# Patient Record
Sex: MALE | Race: WHITE | Employment: FULL TIME | URBAN - METROPOLITAN AREA
[De-identification: names, ages, dates, MRNs, and addresses within clinical notes are randomized per-mention and may not be internally consistent; named-entity substitution may affect disease eponyms.]

---
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
## 2019-01-11 ENCOUNTER — HOSPITAL ENCOUNTER (EMERGENCY)
Age: 19
Discharge: HOME | End: 2019-01-11
Payer: COMMERCIAL

## 2019-01-11 VITALS
SYSTOLIC BLOOD PRESSURE: 138 MMHG | WEIGHT: 175 LBS | RESPIRATION RATE: 16 BRPM | DIASTOLIC BLOOD PRESSURE: 68 MMHG | HEART RATE: 76 BPM | BODY MASS INDEX: 23.19 KG/M2 | TEMPERATURE: 98 F | HEIGHT: 73 IN | OXYGEN SATURATION: 99 %

## 2019-01-11 DIAGNOSIS — S05.01XA ABRASION OF RIGHT CORNEA, INITIAL ENCOUNTER: Primary | ICD-10-CM

## 2019-01-11 PROCEDURE — 99282 EMERGENCY DEPT VISIT SF MDM: CPT

## 2019-01-11 PROCEDURE — 6370000000 HC RX 637 (ALT 250 FOR IP): Performed by: PHYSICIAN ASSISTANT

## 2019-01-11 PROCEDURE — 6370000000 HC RX 637 (ALT 250 FOR IP)

## 2019-01-11 RX ORDER — HYDROCODONE BITARTRATE AND ACETAMINOPHEN 5; 325 MG/1; MG/1
1 TABLET ORAL EVERY 6 HOURS PRN
Qty: 12 TABLET | Refills: 0 | Status: SHIPPED | OUTPATIENT
Start: 2019-01-11 | End: 2019-01-14

## 2019-01-11 RX ORDER — TETRACAINE HYDROCHLORIDE 5 MG/ML
2 SOLUTION OPHTHALMIC ONCE
Status: COMPLETED | OUTPATIENT
Start: 2019-01-11 | End: 2019-01-11

## 2019-01-11 RX ORDER — TOBRAMYCIN 3 MG/ML
2 SOLUTION/ DROPS OPHTHALMIC 4 TIMES DAILY
Qty: 1 BOTTLE | Refills: 0 | Status: SHIPPED | OUTPATIENT
Start: 2019-01-11 | End: 2019-01-16

## 2019-01-11 RX ADMIN — TETRACAINE HYDROCHLORIDE 2 DROP: 5 SOLUTION OPHTHALMIC at 13:22

## 2019-01-11 RX ADMIN — FLUORESCEIN SODIUM 1 MG: 1 STRIP OPHTHALMIC at 13:23

## 2019-01-11 ASSESSMENT — PAIN DESCRIPTION - ONSET
ONSET: GRADUAL
ONSET: PROGRESSIVE

## 2019-01-11 ASSESSMENT — PAIN DESCRIPTION - PAIN TYPE
TYPE: ACUTE PAIN
TYPE: ACUTE PAIN

## 2019-01-11 ASSESSMENT — PAIN DESCRIPTION - LOCATION
LOCATION: EYE
LOCATION: EYE

## 2019-01-11 ASSESSMENT — PAIN DESCRIPTION - ORIENTATION
ORIENTATION: RIGHT
ORIENTATION: RIGHT

## 2019-01-11 ASSESSMENT — PAIN DESCRIPTION - FREQUENCY: FREQUENCY: CONTINUOUS

## 2019-01-11 ASSESSMENT — PAIN DESCRIPTION - PROGRESSION
CLINICAL_PROGRESSION: GRADUALLY WORSENING
CLINICAL_PROGRESSION: GRADUALLY WORSENING

## 2019-01-11 ASSESSMENT — PAIN SCALES - GENERAL
PAINLEVEL_OUTOF10: 5
PAINLEVEL_OUTOF10: 5

## 2019-01-11 ASSESSMENT — PAIN DESCRIPTION - DESCRIPTORS
DESCRIPTORS: CONSTANT;DISCOMFORT;BURNING
DESCRIPTORS: CONSTANT;BURNING

## 2019-03-04 ENCOUNTER — APPOINTMENT (OUTPATIENT)
Dept: CT IMAGING | Age: 19
End: 2019-03-04
Payer: OTHER MISCELLANEOUS

## 2019-03-04 ENCOUNTER — HOSPITAL ENCOUNTER (EMERGENCY)
Age: 19
Discharge: HOME | End: 2019-03-04
Attending: EMERGENCY MEDICINE
Payer: OTHER MISCELLANEOUS

## 2019-03-04 VITALS
HEART RATE: 54 BPM | OXYGEN SATURATION: 99 % | RESPIRATION RATE: 16 BRPM | WEIGHT: 170 LBS | BODY MASS INDEX: 22.53 KG/M2 | HEIGHT: 73 IN | TEMPERATURE: 97.8 F
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
## 2019-03-04 DIAGNOSIS — S09.90XA CLOSED HEAD INJURY, INITIAL ENCOUNTER: ICD-10-CM

## 2019-03-04 DIAGNOSIS — S16.1XXA STRAIN OF NECK MUSCLE, INITIAL ENCOUNTER: ICD-10-CM

## 2019-03-04 DIAGNOSIS — S39.012A STRAIN OF LUMBAR REGION, INITIAL ENCOUNTER: ICD-10-CM

## 2019-03-04 PROCEDURE — 99283 EMERGENCY DEPT VISIT LOW MDM: CPT

## 2019-03-04 PROCEDURE — 70450 CT HEAD/BRAIN W/O DYE: CPT

## 2019-03-04 ASSESSMENT — PAIN DESCRIPTION - LOCATION: LOCATION: BACK;NECK

## 2019-03-04 ASSESSMENT — PAIN DESCRIPTION - DESCRIPTORS: DESCRIPTORS: PRESSURE

## 2019-03-04 ASSESSMENT — PAIN DESCRIPTION - ORIENTATION: ORIENTATION: LOWER

## 2019-03-04 ASSESSMENT — PAIN DESCRIPTION - FREQUENCY: FREQUENCY: CONTINUOUS

## 2019-03-04 ASSESSMENT — PAIN SCALES - GENERAL: PAINLEVEL_OUTOF10: 6

## 2019-05-14 ENCOUNTER — HOSPITAL ENCOUNTER (EMERGENCY)
Age: 19
Discharge: HOME | End: 2019-05-14
Attending: EMERGENCY MEDICINE
Payer: COMMERCIAL

## 2019-05-14 ENCOUNTER — APPOINTMENT (OUTPATIENT)
Dept: GENERAL RADIOLOGY | Age: 19
End: 2019-05-14
Payer: COMMERCIAL

## 2019-05-14 VITALS
HEIGHT: 73 IN | WEIGHT: 165 LBS | SYSTOLIC BLOOD PRESSURE: 118 MMHG | TEMPERATURE: 98.4 F | BODY MASS INDEX: 21.87 KG/M2 | HEART RATE: 76 BPM | RESPIRATION RATE: 16 BRPM | DIASTOLIC BLOOD PRESSURE: 74 MMHG | OXYGEN SATURATION: 99 %

## 2019-05-14 DIAGNOSIS — S62.306A CLOSED FRACTURE OF FIFTH METACARPAL BONE OF RIGHT HAND, UNSPECIFIED FRACTURE MORPHOLOGY, INITIAL ENCOUNTER: ICD-10-CM

## 2019-05-14 DIAGNOSIS — S61.411A LACERATION OF RIGHT HAND WITHOUT FOREIGN BODY, INITIAL ENCOUNTER: Primary | ICD-10-CM

## 2019-05-14 PROCEDURE — 99283 EMERGENCY DEPT VISIT LOW MDM: CPT

## 2019-05-14 PROCEDURE — 12001 RPR S/N/AX/GEN/TRNK 2.5CM/<: CPT

## 2019-05-14 PROCEDURE — 29125 APPL SHORT ARM SPLINT STATIC: CPT

## 2019-05-14 PROCEDURE — 73130 X-RAY EXAM OF HAND: CPT

## 2019-05-14 ASSESSMENT — PAIN DESCRIPTION - PAIN TYPE: TYPE: ACUTE PAIN

## 2019-05-14 ASSESSMENT — PAIN DESCRIPTION - ORIENTATION: ORIENTATION: RIGHT

## 2019-05-14 ASSESSMENT — PAIN DESCRIPTION - DESCRIPTORS: DESCRIPTORS: DISCOMFORT

## 2019-05-14 ASSESSMENT — PAIN DESCRIPTION - FREQUENCY: FREQUENCY: CONTINUOUS

## 2019-05-14 ASSESSMENT — PAIN DESCRIPTION - LOCATION: LOCATION: HAND

## 2019-05-14 ASSESSMENT — PAIN SCALES - GENERAL: PAINLEVEL_OUTOF10: 10

## 2019-05-14 NOTE — LETTER
[address] [facility] Emergency Department  [facility] [address]  Phone: [phone]               May 14, 2019    Patient: Vy Dai   YOB: 2000   Date of Visit: 5/14/2019       To Whom It May Concern:    Yazan Ross was seen and treated in our emergency department on 5/14/2019. He may return to work after following up with his physician.       Sincerely,       Jhoan Awad RN         Signature:__________________________________

## 2019-05-14 NOTE — ED NOTES
Discharge instructions given, patient verbalized their understanding, no other noted or stated problems at this time. Patient will follow up with primary doctor for care.      Herson Schuler RN  05/14/19 3431

## 2019-05-14 NOTE — ED PROVIDER NOTES
HPI:  5/14/19,   Time: 8:34 AM         Irais Donald is a 22 y.o. male presenting to the ED for a punch injury to the right hand, beginning less than one hour ago. The complaint has been constant, moderate in severity, and worsened by movement of the fingers. ROS:   Pertinent positives and negatives are stated within HPI, all other systems reviewed and are negative.  --------------------------------------------- PAST HISTORY ---------------------------------------------  Past Medical History:  has a past medical history of [facility]brand [facility] ([facility] Past Surgical History:  has a past surgical history that includes Tonsillectomy. Social History:  reports that he has never smoked. He has never used smokeless tobacco. He reports that he does not drink alcohol or use drugs. Family History: family history is not on file. The patients home medications have been reviewed. Allergies: Asa [aspirin] and Nsaids    -------------------------------------------------- RESULTS -------------------------------------------------  All laboratory and radiology results have been personally reviewed by myself   LABS:  No results found for this visit on 05/14/19. RADIOLOGY:  Interpreted by Radiologist.  XR HAND RIGHT (MIN 3 VIEWS)   Final Result   Fracture of the midshaft of the right fifth digit   metacarpal with dorsal and ulnar apex angulation.             ------------------------- NURSING NOTES AND VITALS REVIEWED ---------------------------   The nursing notes within the ED encounter and vital signs as below have been reviewed.    /74   Pulse 76   Temp 98.4 °F (36.9 °C) (Oral)   Resp 16   Ht 6' 1\" (1.854 m)   Wt 165 lb (74.8 kg)   SpO2 99%   BMI 21.77 kg/m²   Oxygen Saturation Interpretation: Normal      ---------------------------------------------------PHYSICAL EXAM--------------------------------------      Constitutional/General: Alert and oriented x3, well appearing, non toxic in Discharge to home  Patient condition is stable                  Arlin Gutierrez MD  05/14/19 9583       Arlin Gutierrez MD  05/14/19 7367

## 2019-05-21 ENCOUNTER — OFFICE VISIT (OUTPATIENT)
Dept: ORTHOPEDIC SURGERY | Age: 19
End: 2019-05-21
Payer: COMMERCIAL

## 2019-05-21 VITALS
DIASTOLIC BLOOD PRESSURE: 80 MMHG | SYSTOLIC BLOOD PRESSURE: 139 MMHG | WEIGHT: 161 LBS | TEMPERATURE: 98.3 F | BODY MASS INDEX: 21.34 KG/M2 | HEART RATE: 70 BPM | HEIGHT: 73 IN

## 2019-05-21 DIAGNOSIS — S61.411A LACERATION OF RIGHT HAND WITHOUT FOREIGN BODY, INITIAL ENCOUNTER: ICD-10-CM

## 2019-05-21 DIAGNOSIS — S62.306A CLOSED FRACTURE OF FIFTH METACARPAL BONE OF RIGHT HAND, UNSPECIFIED FRACTURE MORPHOLOGY, INITIAL ENCOUNTER: Primary | ICD-10-CM

## 2019-05-21 PROCEDURE — 99203 OFFICE O/P NEW LOW 30 MIN: CPT | Performed by: ORTHOPAEDIC SURGERY

## 2019-05-21 PROCEDURE — L3917 METACARP FX ORTHOSIS PRE CST: HCPCS | Performed by: ORTHOPAEDIC SURGERY

## 2019-05-21 NOTE — LETTER
[address] [facility].  [address] [facility] [address]  Phone: [phone]  Fax: [phone], [place]        May 21, 2019     Patient: Abel Lopez   YOB: 2000   Date of Visit: 5/21/2019       To Whom It May Concern: It is my medical opinion that Nel Apa may return to full duty immediately with no restrictions. If you have any questions or concerns, please don't hesitate to call.     Sincerely,        Donna Morel MD

## 2019-05-23 ENCOUNTER — HOSPITAL ENCOUNTER (EMERGENCY)
Age: 19
Discharge: HOME | End: 2019-05-23
Attending: EMERGENCY MEDICINE
Payer: COMMERCIAL

## 2019-05-23 VITALS
TEMPERATURE: 98 F | SYSTOLIC BLOOD PRESSURE: 138 MMHG | HEART RATE: 72 BPM | BODY MASS INDEX: 21.67 KG/M2 | OXYGEN SATURATION: 96 % | RESPIRATION RATE: 17 BRPM | HEIGHT: 72 IN | DIASTOLIC BLOOD PRESSURE: 78 MMHG | WEIGHT: 160 LBS

## 2019-05-23 DIAGNOSIS — F41.9 ANXIETY: ICD-10-CM

## 2019-05-23 DIAGNOSIS — F41.0 PANIC ATTACK: Primary | ICD-10-CM

## 2019-05-23 LAB
EKG ATRIAL RATE: 68 BPM
EKG P AXIS: 36 DEGREES
EKG P-R INTERVAL: 134 MS
EKG Q-T INTERVAL: 384 MS
EKG QRS DURATION: 94 MS
EKG QTC CALCULATION (BAZETT): 408 MS
EKG R AXIS: 73 DEGREES
EKG T AXIS: 43 DEGREES
EKG VENTRICULAR RATE: 68 BPM

## 2019-05-23 PROCEDURE — 93005 ELECTROCARDIOGRAM TRACING: CPT | Performed by: FAMILY MEDICINE

## 2019-05-23 PROCEDURE — 99284 EMERGENCY DEPT VISIT MOD MDM: CPT

## 2019-05-23 PROCEDURE — 93010 ELECTROCARDIOGRAM REPORT: CPT | Performed by: INTERNAL MEDICINE

## 2019-05-23 RX ORDER — DIVALPROEX SODIUM 250 MG/1
250 TABLET, DELAYED RELEASE ORAL
Status: ON HOLD | COMMUNITY
End: 2020-04-29 | Stop reason: HOSPADM

## 2019-05-23 RX ORDER — M-VIT,TX,IRON,MINS/CALC/FOLIC 27MG-0.4MG
1 TABLET ORAL DAILY
COMMUNITY
End: 2022-08-21

## 2019-05-23 NOTE — ED PROVIDER NOTES
---------------------------   The nursing notes within the ED encounter and vital signs as below have been reviewed. /78   Pulse 72   Temp 98 °F (36.7 °C)   Resp 17   Ht 6' (1.829 m)   Wt 160 lb (72.6 kg)   SpO2 96%   BMI 21.70 kg/m²   Oxygen Saturation Interpretation: Normal      ---------------------------------------------------PHYSICAL EXAM--------------------------------------    Constitutional/General: Alert and oriented x3, well appearing, non toxic in NAD  HEENT: NCAT, PERRL, EOMI, conjunctiva normal, sclera non icteric, oropharynx clear, no obvious dental caries  Neck: Supple, full ROM, non tender to palpation in the midline, no JVD or carotid bruits  Respiratory: Lungs CTAB, no wheezes, rales, or rhonchi. Not in respiratory distress  Cardiovascular: RRR. No murmurs, gallops, or rubs. 2+ distal pulses  Chest: No chest wall tenderness  GI:  Abdomen SNTND, +BSx4. No rebound, guarding, or rigidity. Musculoskeletal: Moves all extremities x 4. Warm and well perfused, no clubbing, cyanosis, or edema. Capillary refill <3 seconds  Neurologic: GCS 15, no focal deficits, symmetric strength 5/5 in the upper and lower extremities bilaterally  Psychiatric: Normal Affect    EKG Interpretation    Interpreted by emergency department physician    Rhythm: sinus arrhythmia  Rate: normal  Axis: normal  Ectopy: none  Conduction: normal  ST Segments: normal  T Waves: normal  Q Waves: none    Clinical Impression: sinus arrhythmia, no previous EKGs available for comparison    Emi Mc MD     ------------------------------ ED COURSE/MEDICAL DECISION MAKING----------------------  Medications - No data to display    Medical Decision Making:    Patricia Perez is a 22 y.o. male with a PMH of anxiety who presented to the ED for evaluation of likely panic attack. Physical exam reveals no abnormalities. EKG was obtained and showed sinus arrhythmia. Patient refused CXR and lab work.  His symptoms significantly improved without intervention. Patient was determined to be in suitable condition for discharge to home and encouraged to follow up closely with PCP and psych. Counseling: The emergency provider has spoken with the patient and discussed todays results, in addition to providing specific details for the plan of care and counseling regarding the diagnosis and prognosis. Questions are answered at this time and they are agreeable with the plan.    --------------------------------- IMPRESSION AND DISPOSITION ---------------------------------    IMPRESSION  1. Panic attack    2.  Anxiety        DISPOSITION  Disposition: Discharge to home  Patient condition is stable             Savana Del Toro MD  Resident  05/23/19 [id number]

## 2019-05-23 NOTE — ED NOTES
Contact made with pt who does not want any labs or CXR at this time. Stated he knows he was having a panic attack and now feels better. Denies any dizzines, cp, SOB, or tachycardia. STated he drinks pre-work out drinks and that could possibly have an impact on him. Stated he will follow up with his PCP and his therapist regarding issues.        Amena Rios RN  05/23/19 3106

## 2019-06-11 ENCOUNTER — OFFICE VISIT (OUTPATIENT)
Dept: ORTHOPEDIC SURGERY | Age: 19
End: 2019-06-11
Payer: COMMERCIAL

## 2019-06-11 VITALS
HEART RATE: 69 BPM | TEMPERATURE: 99.4 F | DIASTOLIC BLOOD PRESSURE: 73 MMHG | BODY MASS INDEX: 21.87 KG/M2 | SYSTOLIC BLOOD PRESSURE: 122 MMHG | HEIGHT: 73 IN | WEIGHT: 165 LBS

## 2019-06-11 DIAGNOSIS — S62.306A CLOSED FRACTURE OF FIFTH METACARPAL BONE OF RIGHT HAND, UNSPECIFIED FRACTURE MORPHOLOGY, INITIAL ENCOUNTER: ICD-10-CM

## 2019-06-11 DIAGNOSIS — S62.306A CLOSED FRACTURE OF FIFTH METACARPAL BONE OF RIGHT HAND, UNSPECIFIED FRACTURE MORPHOLOGY, INITIAL ENCOUNTER: Primary | ICD-10-CM

## 2019-06-11 PROCEDURE — 99212 OFFICE O/P EST SF 10 MIN: CPT | Performed by: ORTHOPAEDIC SURGERY

## 2019-06-11 NOTE — PROGRESS NOTES
Chief Complaint:   Chief Complaint   Patient presents with    Hand Injury     F/u right hand 5th metacarpal fx and laceration. Pt. feels fine has slight swelling. Arias Zhu presents for follow-up of his right hand injury, functionally doing very well, does have some discomfort with motion of the ring finger MP but minimal to no pain of the fifth finger. He has been compliant with the [facility], he is pursuing all normal activities without significant difficulty. Allergies; medications; past medical, surgical, family, and social history; and problem list have been reviewed today and updated as indicated in this encounter seen below. Exam: Right hand lacerations are healed, active and passive motion of MP and IP joints are normal in all fingers. No visible or palpable deformity of the midshaft right fifth metacarpal, stable painless and no crepitus with stress testing.  strength approaching normal.  No motor or sensory deficits no rotational or angular deformity. Radiographs: Three-view x-rays right hand today show the nondisplaced minimally angulated fracture midshaft right fifth metacarpal with abundant bridging callus formation consistent with normal progress towards union. Carmen Mc was seen today for hand injury. Diagnoses and all orders for this visit:    Closed fracture of fifth metacarpal bone of right hand, unspecified fracture morphology, subsequent encounter  -     XR HAND RIGHT (MIN 3 VIEWS)    Closed fracture of fifth metacarpal bone of right hand, unspecified fracture morphology, initial encounter  -     XR HAND RIGHT (MIN 3 VIEWS)       Patient may wean himself from the [address] gradually resume activities to tolerance, questions asked and answered follow-up as needed. Return if symptoms worsen or fail to improve.      Current Outpatient Medications   Medication Sig Dispense Refill    divalproex (DEPAKOTE) 250 MG DR tablet Take 250 mg by mouth 3 times daily      Multiple Vitamins-Minerals (THERAPEUTIC MULTIVITAMIN-MINERALS) tablet Take 1 tablet by mouth daily       No current facility-administered medications for this visit. There is no problem list on file for this patient. 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    Asa [Aspirin]      Can't take asa or advil-\"thins my blood\" occurred when had surgery age 4yrs old    Nsaids        Social History     Socioeconomic History    Marital status: Single     Spouse name: None    Number of children: None    Years of education: None    Highest education level: None   Occupational History    None   Social Needs    Financial resource strain: None    Food insecurity:     Worry: None     Inability: None    Transportation needs:     Medical: None     Non-medical: None   Tobacco Use    Smoking status: Current Every Day Smoker    Smokeless tobacco: Never Used   Substance and Sexual Activity    Alcohol use: No    Drug use: No    Sexual activity: None   Lifestyle    Physical activity:     Days per week: None     Minutes per session: None    Stress: None   Relationships    Social connections:     Talks on phone: None     Gets together: None     Attends Anabaptism service: None     Active member of club or organization: None     Attends meetings of clubs or organizations: None     Relationship status: None    Intimate partner violence:     Fear of current or ex partner: None     Emotionally abused: None     Physically abused: None     Forced sexual activity: None   Other Topics Concern    None   Social History Narrative    None       No family history on file.       Review of Systems  As follows except as previously noted in HPI:  Constitutional: Negative for chills, diaphoresis, fatigue, fever and unexpected weight change. Respiratory: Negative for cough, shortness of breath and wheezing. Cardiovascular: Negative for chest pain and palpitations. Neurological: Negative for dizziness, syncope, cephalgia. GI / : negative  Musculoskeletal: see HPI       Objective:   Physical Exam   Constitutional: Oriented to person, place, and time. and appears well-developed and well-nourished. :   Head: Normocephalic and atraumatic. Eyes: EOM are normal.   Neck: Neck supple. Cardiovascular: Normal rate and regular rhythm. Pulmonary/Chest: Effort normal. No stridor. No respiratory distress, no wheezes. Abdominal:  No abnormal distension. Neurological: Alert and oriented to person, place, and time. Skin: Skin is warm and dry. Psychiatric: Normal mood and affect.  Behavior is normal. Thought content normal.    6/11/2019  12:55 PM

## 2020-04-24 ENCOUNTER — HOSPITAL ENCOUNTER (INPATIENT)
Age: 20
LOS: 5 days | Discharge: HOME | DRG: 885 | End: 2020-04-29
Attending: EMERGENCY MEDICINE | Admitting: PSYCHIATRY & NEUROLOGY
Payer: COMMERCIAL

## 2020-04-24 PROBLEM — F32.9 DEPRESSION, MAJOR, SINGLE EPISODE: Status: ACTIVE | Noted: 2020-04-24

## 2020-04-24 LAB
ACETAMINOPHEN LEVEL: <5 MCG/ML (ref 10–30)
ALBUMIN SERPL-MCNC: 5 G/DL (ref 3.5–5.2)
ALP BLD-CCNC: 67 U/L (ref 40–129)
ALT SERPL-CCNC: 114 U/L (ref 0–40)
AMPHETAMINE SCREEN, URINE: NOT DETECTED
ANION GAP SERPL CALCULATED.3IONS-SCNC: 12 MMOL/L (ref 7–16)
AST SERPL-CCNC: 63 U/L (ref 0–39)
BARBITURATE SCREEN URINE: NOT DETECTED
BASOPHILS ABSOLUTE: 0.04 E9/L (ref 0–0.2)
BASOPHILS RELATIVE PERCENT: 0.7 % (ref 0–2)
BENZODIAZEPINE SCREEN, URINE: NOT DETECTED
BILIRUB SERPL-MCNC: 1 MG/DL (ref 0–1.2)
BUN BLDV-MCNC: 13 MG/DL (ref 6–20)
CALCIUM SERPL-MCNC: 10.2 MG/DL (ref 8.6–10.2)
CANNABINOID SCREEN URINE: POSITIVE
CHLORIDE BLD-SCNC: 102 MMOL/L (ref 98–107)
CO2: 25 MMOL/L (ref 22–29)
COCAINE METABOLITE SCREEN URINE: NOT DETECTED
CREAT SERPL-MCNC: 0.8 MG/DL (ref 0.7–1.2)
EOSINOPHILS ABSOLUTE: 0.18 E9/L (ref 0.05–0.5)
EOSINOPHILS RELATIVE PERCENT: 3.4 % (ref 0–6)
ETHANOL: <10 MG/DL (ref 0–0.08)
FENTANYL SCREEN, URINE: NOT DETECTED
GFR AFRICAN AMERICAN: >60
GFR NON-AFRICAN AMERICAN: >60 ML/MIN/1.73
GLUCOSE BLD-MCNC: 93 MG/DL (ref 74–99)
HCT VFR BLD CALC: 44 % (ref 37–54)
HEMOGLOBIN: 15 G/DL (ref 12.5–16.5)
IMMATURE GRANULOCYTES #: 0.01 E9/L
IMMATURE GRANULOCYTES %: 0.2 % (ref 0–5)
LYMPHOCYTES ABSOLUTE: 1.96 E9/L (ref 1.5–4)
LYMPHOCYTES RELATIVE PERCENT: 36.6 % (ref 20–42)
Lab: ABNORMAL
MCH RBC QN AUTO: 31.6 PG (ref 26–35)
MCHC RBC AUTO-ENTMCNC: 34.1 % (ref 32–34.5)
MCV RBC AUTO: 92.8 FL (ref 80–99.9)
METHADONE SCREEN, URINE: NOT DETECTED
MONOCYTES ABSOLUTE: 0.69 E9/L (ref 0.1–0.95)
MONOCYTES RELATIVE PERCENT: 12.9 % (ref 2–12)
NEUTROPHILS ABSOLUTE: 2.48 E9/L (ref 1.8–7.3)
NEUTROPHILS RELATIVE PERCENT: 46.2 % (ref 43–80)
OPIATE SCREEN URINE: NOT DETECTED
OXYCODONE URINE: NOT DETECTED
PDW BLD-RTO: 12.4 FL (ref 11.5–15)
PHENCYCLIDINE SCREEN URINE: NOT DETECTED
PLATELET # BLD: 203 E9/L (ref 130–450)
PMV BLD AUTO: 11.1 FL (ref 7–12)
POTASSIUM REFLEX MAGNESIUM: 4.6 MMOL/L (ref 3.5–5)
RBC # BLD: 4.74 E12/L (ref 3.8–5.8)
SALICYLATE, SERUM: <0.3 MG/DL (ref 0–30)
SODIUM BLD-SCNC: 139 MMOL/L (ref 132–146)
TOTAL PROTEIN: 7.8 G/DL (ref 6.4–8.3)
TRICYCLIC ANTIDEPRESSANTS SCREEN SERUM: NEGATIVE NG/ML
VALPROIC ACID LEVEL: 51 MCG/ML (ref 50–100)
WBC # BLD: 5.4 E9/L (ref 4.5–11.5)

## 2020-04-24 PROCEDURE — 85025 COMPLETE CBC W/AUTO DIFF WBC: CPT

## 2020-04-24 PROCEDURE — 80164 ASSAY DIPROPYLACETIC ACD TOT: CPT

## 2020-04-24 PROCEDURE — 99285 EMERGENCY DEPT VISIT HI MDM: CPT

## 2020-04-24 PROCEDURE — G0480 DRUG TEST DEF 1-7 CLASSES: HCPCS

## 2020-04-24 PROCEDURE — 80053 COMPREHEN METABOLIC PANEL: CPT

## 2020-04-24 PROCEDURE — 80307 DRUG TEST PRSMV CHEM ANLYZR: CPT

## 2020-04-24 PROCEDURE — 93005 ELECTROCARDIOGRAM TRACING: CPT | Performed by: EMERGENCY MEDICINE

## 2020-04-24 PROCEDURE — 1240000000 HC EMOTIONAL WELLNESS R&B

## 2020-04-24 PROCEDURE — 6370000000 HC RX 637 (ALT 250 FOR IP): Performed by: EMERGENCY MEDICINE

## 2020-04-24 RX ORDER — NICOTINE 21 MG/24HR
1 PATCH, TRANSDERMAL 24 HOURS TRANSDERMAL ONCE
Status: COMPLETED | OUTPATIENT
Start: 2020-04-24 | End: 2020-04-25

## 2020-04-24 RX ORDER — DIVALPROEX SODIUM 250 MG/1
500 TABLET, DELAYED RELEASE ORAL ONCE
Status: COMPLETED | OUTPATIENT
Start: 2020-04-24 | End: 2020-04-24

## 2020-04-24 RX ORDER — DIVALPROEX SODIUM 500 MG/1
500 TABLET, DELAYED RELEASE ORAL 2 TIMES DAILY
Status: ON HOLD | COMMUNITY
End: 2020-04-29 | Stop reason: HOSPADM

## 2020-04-24 RX ADMIN — DIVALPROEX SODIUM 500 MG: 250 TABLET, DELAYED RELEASE ORAL at 21:47

## 2020-04-24 ASSESSMENT — ENCOUNTER SYMPTOMS
NAUSEA: 0
VOMITING: 0
SINUS PRESSURE: 0
SHORTNESS OF BREATH: 0
EYE PAIN: 0
ABDOMINAL PAIN: 0
BACK PAIN: 0
EYE DISCHARGE: 0
DIARRHEA: 0
COUGH: 0
SORE THROAT: 0
EYE REDNESS: 0
WHEEZING: 0

## 2020-04-24 NOTE — ED NOTES
SW met with pt to complete assessment.     Pt is a 19 y/o male pesenting to the ED for suicidal thoughts.  Pt reports he has been experiencing significant stressors: recently was going to [address] and decided to quit, his girlfriend found out she was pregnant and also then broke up with him a week or two ago, he moved out of his dad's and into his mom's house because his dad was not supportive that he was having a child, and he recently lost his job due to COVID-19.       Pt has no history of inpatient hospitalization for behavioral health and no past attempts. Pt states he smokes marijuana daily and denies any other substance use. Pt denies any legal issues. Pt reports he was seeing a counselor at [facility] and recently switched to another agency however he does not know the name of it. Pt reports he receives his psych medications through his PCP.       Pt alert, oriented x4, mood depressed, affect congruent, normal thought process, and intense eye contact.  Pt denies current SI/HI and hallucinations. SW asked regarding SI and pt states everyone has those thoughts sometimes but minimized this with SW.  Pt has no past history of self injurious behaviors.       Pt is pink slipped by [facility] PD due to posting suicidal statement on Facebook.     SW will pursue inpatient psychiatric hospitalization for safety/stabilization.      Jimmy Yang, MSLUCERO, LSW  04/24/20 [id number]

## 2020-04-24 NOTE — ED PROVIDER NOTES
atraumatic. Eyes:      Pupils: Pupils are equal, round, and reactive to light. Neck:      Musculoskeletal: Normal range of motion and neck supple. Cardiovascular:      Rate and Rhythm: Normal rate and regular rhythm. Heart sounds: Normal heart sounds. No murmur. Pulmonary:      Effort: Pulmonary effort is normal. No respiratory distress. Breath sounds: Normal breath sounds. No wheezing or rales. Abdominal:      General: Bowel sounds are normal.      Palpations: Abdomen is soft. Tenderness: There is no abdominal tenderness. There is no guarding or rebound. Skin:     General: Skin is warm and dry. Capillary Refill: Capillary refill takes less than 2 seconds. Neurological:      Mental Status: He is alert and oriented to person, place, and time. Cranial Nerves: No cranial nerve deficit. Coordination: Coordination normal.   Psychiatric:      Comments: Calm and cooperative. Not internally stimulated. Acting appropriately          Procedures     MDM  Number of Diagnoses or Management Options  Suicidal ideation:   Patient presented to the emergency department for suicidal ideations. He was initially pink slipped by police but the entire pink slip was not transmitted to us. After evaluation, patient is risk of harm to himself and has posted on social media that he wants to kill himself. He was pink slipped here in the department. Lab work obtained and essentially unremarkable. Patient was medically cleared. Patient would benefit from further inpatient evaluation hospitalization. Educated patient on symptoms, diagnosis and need to stay. Disposition is pending social work.          ----------------------------------------------- PAST HISTORY --------------------------------------------  Past Medical History:  has a past medical history of Von Willebrand disease ([facility] Past Surgical History:  has a past surgical history that includes Tonsillectomy.     Social History: Serum Drug Screen   Result Value Ref Range    Ethanol Lvl <10 mg/dL    Acetaminophen Level <5.0 (L) 10.0 - 52.9 mcg/mL    Salicylate, Serum <1.2 0.0 - 30.0 mg/dL    TCA Scrn NEGATIVE Cutoff:300 ng/mL   Urine Drug Screen   Result Value Ref Range    Amphetamine Screen, Urine NOT DETECTED Negative <1000 ng/mL    Barbiturate Screen, Ur NOT DETECTED Negative < 200 ng/mL    Benzodiazepine Screen, Urine NOT DETECTED Negative < 200 ng/mL    Cannabinoid Scrn, Ur POSITIVE (A) Negative < 50ng/mL    Cocaine Metabolite Screen, Urine NOT DETECTED Negative < 300 ng/mL    Opiate Scrn, Ur NOT DETECTED Negative < 300ng/mL    PCP Screen, Urine NOT DETECTED Negative < 25 ng/mL    Methadone Screen, Urine NOT DETECTED Negative <300 ng/mL    Oxycodone Urine NOT DETECTED Negative <100 ng/mL    FENTANYL SCREEN, URINE NOT DETECTED Negative <1 ng/mL    Drug Screen Comment: see below    Valproic acid level, total   Result Value Ref Range    Valproic Acid Lvl 51 50 - 100 mcg/mL       RADIOLOGY:    All Radiology results interpreted by Radiologist unless otherwise noted. No orders to display     ---------------------------- NURSING NOTES AND VITALS REVIEWED -------------------------   The nursing notes within the ED encounter and vital signs as below have been reviewed. BP (!) 140/95   Pulse 80   Temp 98 °F (36.7 °C) (Oral)   Resp 16   Ht 6' 1\" (1.854 m)   Wt 160 lb (72.6 kg)   SpO2 97%   BMI 21.11 kg/m²   Oxygen Saturation Interpretation: Normal      ------------------------------------------PROGRESS NOTES -------------------------------------------    ED COURSE MEDICATIONS:              Medications - No data to display    CONSULTATIONS:            Social work. PROCEDURES:            none.       COUNSELING:   I have spoken with the patient and discussed todays results, in addition to providing specific details for the plan of care and counseling regarding the diagnosis and prognosis.     ---------------------------------------

## 2020-04-25 LAB
EKG ATRIAL RATE: 69 BPM
EKG P AXIS: 19 DEGREES
EKG P-R INTERVAL: 146 MS
EKG Q-T INTERVAL: 390 MS
EKG QRS DURATION: 92 MS
EKG QTC CALCULATION (BAZETT): 417 MS
EKG R AXIS: 35 DEGREES
EKG T AXIS: 39 DEGREES
EKG VENTRICULAR RATE: 69 BPM

## 2020-04-25 PROCEDURE — 99221 1ST HOSP IP/OBS SF/LOW 40: CPT | Performed by: PSYCHIATRY & NEUROLOGY

## 2020-04-25 PROCEDURE — 93010 ELECTROCARDIOGRAM REPORT: CPT | Performed by: INTERNAL MEDICINE

## 2020-04-25 PROCEDURE — 1240000000 HC EMOTIONAL WELLNESS R&B

## 2020-04-25 PROCEDURE — 6370000000 HC RX 637 (ALT 250 FOR IP): Performed by: PSYCHIATRY & NEUROLOGY

## 2020-04-25 RX ORDER — VENLAFAXINE 75 MG/1
75 TABLET ORAL ONCE
Status: ON HOLD | COMMUNITY
End: 2020-04-29 | Stop reason: HOSPADM

## 2020-04-25 RX ORDER — NICOTINE 21 MG/24HR
1 PATCH, TRANSDERMAL 24 HOURS TRANSDERMAL DAILY
Status: DISCONTINUED | OUTPATIENT
Start: 2020-04-25 | End: 2020-04-27 | Stop reason: SDUPTHER

## 2020-04-25 RX ORDER — ACETAMINOPHEN 325 MG/1
650 TABLET ORAL EVERY 6 HOURS PRN
Status: DISCONTINUED | OUTPATIENT
Start: 2020-04-25 | End: 2020-04-29 | Stop reason: HOSPADM

## 2020-04-25 RX ORDER — TRAZODONE HYDROCHLORIDE 50 MG/1
50 TABLET ORAL NIGHTLY PRN
Status: DISCONTINUED | OUTPATIENT
Start: 2020-04-25 | End: 2020-04-29 | Stop reason: HOSPADM

## 2020-04-25 RX ORDER — DIVALPROEX SODIUM 500 MG/1
500 TABLET, DELAYED RELEASE ORAL 2 TIMES DAILY
Status: DISCONTINUED | OUTPATIENT
Start: 2020-04-25 | End: 2020-04-29 | Stop reason: HOSPADM

## 2020-04-25 RX ORDER — HYDROXYZINE PAMOATE 50 MG/1
50 CAPSULE ORAL 3 TIMES DAILY PRN
Status: DISCONTINUED | OUTPATIENT
Start: 2020-04-25 | End: 2020-04-29 | Stop reason: HOSPADM

## 2020-04-25 RX ORDER — MAGNESIUM HYDROXIDE/ALUMINUM HYDROXICE/SIMETHICONE 120; 1200; 1200 MG/30ML; MG/30ML; MG/30ML
30 SUSPENSION ORAL PRN
Status: DISCONTINUED | OUTPATIENT
Start: 2020-04-25 | End: 2020-04-29 | Stop reason: HOSPADM

## 2020-04-25 RX ORDER — DIVALPROEX SODIUM 250 MG/1
250 TABLET, DELAYED RELEASE ORAL
Status: DISCONTINUED | OUTPATIENT
Start: 2020-04-25 | End: 2020-04-29 | Stop reason: HOSPADM

## 2020-04-25 RX ORDER — HALOPERIDOL 5 MG/ML
5 INJECTION INTRAMUSCULAR EVERY 6 HOURS PRN
Status: DISCONTINUED | OUTPATIENT
Start: 2020-04-25 | End: 2020-04-29 | Stop reason: HOSPADM

## 2020-04-25 RX ORDER — HALOPERIDOL 5 MG
5 TABLET ORAL EVERY 6 HOURS PRN
Status: DISCONTINUED | OUTPATIENT
Start: 2020-04-25 | End: 2020-04-29 | Stop reason: HOSPADM

## 2020-04-25 RX ORDER — VENLAFAXINE HYDROCHLORIDE 37.5 MG/1
112.5 CAPSULE, EXTENDED RELEASE ORAL
Status: DISCONTINUED | OUTPATIENT
Start: 2020-04-25 | End: 2020-04-29 | Stop reason: HOSPADM

## 2020-04-25 RX ADMIN — TRAZODONE HYDROCHLORIDE 50 MG: 50 TABLET ORAL at 03:19

## 2020-04-25 RX ADMIN — DIVALPROEX SODIUM 500 MG: 250 TABLET, DELAYED RELEASE ORAL at 21:50

## 2020-04-25 RX ADMIN — TRAZODONE HYDROCHLORIDE 50 MG: 50 TABLET ORAL at 21:50

## 2020-04-25 RX ADMIN — DIVALPROEX SODIUM 500 MG: 250 TABLET, DELAYED RELEASE ORAL at 11:16

## 2020-04-25 RX ADMIN — DIVALPROEX SODIUM 250 MG: 250 TABLET, DELAYED RELEASE ORAL at 16:16

## 2020-04-25 RX ADMIN — VENLAFAXINE HYDROCHLORIDE 112.5 MG: 37.5 CAPSULE, EXTENDED RELEASE ORAL at 11:15

## 2020-04-25 ASSESSMENT — LIFESTYLE VARIABLES
HISTORY_ALCOHOL_USE: NO
HISTORY_ALCOHOL_USE: NO

## 2020-04-25 ASSESSMENT — SLEEP AND FATIGUE QUESTIONNAIRES
DIFFICULTY STAYING ASLEEP: YES
RESTFUL SLEEP: NO
AVERAGE NUMBER OF SLEEP HOURS: 5
DO YOU HAVE DIFFICULTY SLEEPING: YES
DIFFICULTY ARISING: YES
DIFFICULTY FALLING ASLEEP: YES
DO YOU USE A SLEEP AID: NO
DIFFICULTY ARISING: YES
RESTFUL SLEEP: YES
DIFFICULTY FALLING ASLEEP: YES
DO YOU HAVE DIFFICULTY SLEEPING: YES
DIFFICULTY STAYING ASLEEP: NO
SLEEP PATTERN: DIFFICULTY FALLING ASLEEP
SLEEP PATTERN: DIFFICULTY FALLING ASLEEP;DIFFICULTY ARISING;NIGHTMARES/TERRORS
DO YOU USE A SLEEP AID: NO

## 2020-04-25 ASSESSMENT — PAIN SCALES - GENERAL
PAINLEVEL_OUTOF10: 0

## 2020-04-25 ASSESSMENT — PATIENT HEALTH QUESTIONNAIRE - PHQ9
SUM OF ALL RESPONSES TO PHQ QUESTIONS 1-9: 7
SUM OF ALL RESPONSES TO PHQ QUESTIONS 1-9: 14

## 2020-04-25 NOTE — CARE COORDINATION
Biopsychosocial Assessment Note    Social work met with patient to complete the biopsychosocial assessment and CSSR-S. Pt  Was pleasant and cooperative to the interview    Mental Status Exam: Pt is alert and oriented to 4x     Chief Complaint:  Pt presents to hospitial with significant stressor with suicide ideations    Patient Report: Pt reports he presented to [facility] with suicidal ideation. Pt was sent to [facility] [place] Pt report broke up with girlfriend who is pregnant with his baby    Gender  [x] Male [] Female [] Transgender  [] Other    Sexual Orientation    [x] Heterosexual [] Homosexual [] Bisexual [] Other    Suicidal Ideation  [x] Reports [] Denies    Homicidal Ideation  [] Reports [x] Denies      Hallucinations/Delusions (Specify type)  [x] Reports [] Denies     Substance Use/Alcohol Use/Addiction  [x] Reports [] Denies     Trauma History  [] Reports [x] Denies     Collateral Contact (CASSIE signed)  Name: Yudith Tracey  Relationship:Mother  Number:  [phone]     Collateral Information: Mother  Mother very concern report pt has hx of Bipolar an anger mangement, Mother report she is very worried ,as pt best friend committed suicide when he was 11, Mothr report pt is adoptivshe beliefs his biological father had mental health problems  Mother sreports she reared as single father after his adopptive family left but he remains involved with patient.  Patient has a big supportive family    Follow up provider: [facility]    Plan for discharge (where they live can they return): Return to mother's home

## 2020-04-25 NOTE — PROGRESS NOTES
Attended community meeting shared goal for the day as to sit with my thoughts and let them pass. Recreation assessment completed.

## 2020-04-25 NOTE — GROUP NOTE
Date: 4/25/2020    Group Start Time: [address]  Group End Time: 1120  Group Topic: Psychoeducation    [facility]  [address] [facility]        Group Therapy Note      Wellness Binder Information  Module Name:  wellness monitor   Session Number: na    Patient's Goal:  patient will be able to id ways to balance his/her wellness over a week. Notes:pleasant and sharing thru-out group able to participate appropriately. Status After Intervention:  Improved    Participation Level:  Active Listener and Interactive    Participation Quality: Appropriate, Attentive, Sharing, and Supportive      Speech:  normal     Thought Process/Content: Logical      Affective Functioning: Congruent      Mood: euthymic      Level of consciousness:  Alert, Oriented x4, and Attentive      Response to Learning: Able to verbalize/acknowledge new learning, Able to retain information, and Progressing to goal      Endings: None Reported    Modes of Intervention: Education, Support, Socialization, Exploration, and Problem-solving      Discipline Responsible: Psychoeducational Specialist      Signature:  Too Escobedo

## 2020-04-25 NOTE — PLAN OF CARE
Problem: Depressive Behavior With or Without Suicide Precautions:  Goal: Able to verbalize and/or display a decrease in depressive symptoms  Description: Able to verbalize and/or display a decrease in depressive symptoms  4/25/2020 0937 by Gerardo Altamirano RN  Outcome: Ongoing  4/25/2020 0152 by Live Evans RN  Outcome: Ongoing     Problem: Depressive Behavior With or Without Suicide Precautions:  Goal: Absence of self-harm  Description: Absence of self-harm  4/25/2020 0937 by Gerardo Altamirano RN  Outcome: Met This Shift  4/25/2020 0152 by Live Evans RN  Outcome: Ongoing   pt denies si/hi and hallucinations. Pt states when his girl friend told him she was pregnant he felt his dad did not want him to have a baby so he got in a fight with his dad. Pt states the stress of fighting with his dad, his best friend committing suicide, having cabin fever from not being able to go to the gym, recently finding out he was adopted, and breaking up with his girlfriend caused him to post things online \"he didn't mean\". Pt is calm and cooperative. Pt out on unit and is social with peers. Pt takes med's and attends groups.

## 2020-04-25 NOTE — PROGRESS NOTES
Admission Note: Pt escorted from [facility] AN AFFILIATE OF [facility] via W/C accompanied by Transport for involuntary pink slip admit to [facility]. Alert and oriented x 4. Thoughts logical organized and relevant. Denied suicidal/homicidal ideations and hallucinations. No delusions or preoccupation revealed. Offered information freely in order to complete admission data base. Pt revealed his stressors are financial, having a new baby \"on the way\" and arguments with girlfriend. Denied ever having a suicide plan, but only a thought of death wish. Left admission interview and went directly to bed, desires to sleep. Place on close observation. `[facility]  Admission Note     Admission Type:   Admission Type:  Involuntary(Pink slipped)    Reason for admission:  Reason for Admission: \"I sent a suicidal quote on social media\"      PATIENT STRENGTHS:  Strengths: Communication, Medication Compliance, No significant Physical Illness, Positive Support, Social Skills    Patient Strengths and Limitations:  Limitations: Difficult relationships / poor social skills, Tendency to isolate self    Addictive Behavior:   Addictive Behavior  Do you have a history of Chemical Use?: No  Do you have a history of Alcohol Use?: No  Do you have a history of Street Drug Abuse?: Yes  Histroy of Prescripton Drug Abuse?: No    Medical Problems:   Past Medical History:   Diagnosis Date    Von Willebrand disease ([facility]        Status EXAM:  Status and Exam  Normal: Yes  Facial Expression: Sad  Affect: Appropriate  Level of Consciousness: Alert  Mood:Normal: No  Mood: Depressed, Sad, Anxious  Interview Behavior: Cooperative  Preception: [place] to Person, [place] to Time, [place] to Place, [place] to Situation  Attention:Normal: Yes  Thought Content:Normal: Yes  Hallucinations: None  Delusions: No(None revealed)  Memory:Normal: Yes  Insight and Judgment: No  Insight and Judgment: Poor Judgment  Present Suicidal Ideation: No  Present

## 2020-04-25 NOTE — H&P
would grow up in a broken home. He said that the fact that he had not been able to go to the gym either, to work out through his anxiety, had not helped either. When asked about the intensity of the current suicidal ideations, he said they were \"not that bad\", \"more in an angry moment\". He denied having homicidal ideations. He denied having hallucinations, paranoia. He said he had been seeing a therapist prior to the pandemic, and that he is taking his medications, \"regularly\". The patient is currently receiving care for the above psychiatric illness. Medications Prior to Admission:   Medications Prior to Admission: venlafaxine (EFFEXOR) 75 MG tablet, Take 75 mg by mouth once Take in AM  divalproex (DEPAKOTE) 500 MG DR tablet, Take 500 mg by mouth 2 times daily PATIENT REPORTS TAKING 500 MG IN AM, 250 MG AFTERNOON, 500 MG AT NIGHT.  divalproex (DEPAKOTE) 250 MG DR tablet, Take 250 mg by mouth Daily with lunch PATIENT REPORTS TAKING 500 MG IN AM, 250 MG AFTERNOON, 500 MG AT NIGHT. Multiple Vitamins-Minerals (THERAPEUTIC MULTIVITAMIN-MINERALS) tablet, Take 1 tablet by mouth daily    Compliance: reported positive    Psychiatric Review of Systems       Depression: yes     Maggie or Hypomania:  yes - ?hypomanic symptoms in the past     Panic Attacks:  no     Phobias:  no     Obsessions and Compulsions:  no     PTSD : no     Hallucinations:  no     Delusions:  no    Substance Abuse History:  ETOH: denies   Marijuana: yes, 1-3 grams/day  Opiates: denies  Other Drugs: denies      Past Psychiatric History:  Prior Diagnosis:  Bipolar disorder  Psychiatrist: no  Therapist: yes  Hospitalization: no  Hx of Suicidal Attempts: no  Hx of violence:  no  ECT: no      Past Medical History:        Diagnosis Date    Von Willebrand disease ([facility] [address]    Pectus excavatum    Past Surgical History:        Procedure Laterality Date    TONSILLECTOMY         Allergies:   Advil [ibuprofen];  Beuford Cowman; and Nsaids    Family

## 2020-04-25 NOTE — ED NOTES
Patient accepted to 7S, room 7522-B by Dr. Alma Carreon. Admitting notified.       Hafsa Tellez RN  04/24/20 2036

## 2020-04-26 PROBLEM — F31.81 BIPOLAR II DISORDER, MODERATE, DEPRESSED, WITH ANXIOUS DISTRESS (HCC): Status: ACTIVE | Noted: 2020-04-26

## 2020-04-26 PROCEDURE — 1240000000 HC EMOTIONAL WELLNESS R&B

## 2020-04-26 PROCEDURE — 99231 SBSQ HOSP IP/OBS SF/LOW 25: CPT | Performed by: PSYCHIATRY & NEUROLOGY

## 2020-04-26 PROCEDURE — 6370000000 HC RX 637 (ALT 250 FOR IP): Performed by: PSYCHIATRY & NEUROLOGY

## 2020-04-26 RX ADMIN — DIVALPROEX SODIUM 250 MG: 250 TABLET, DELAYED RELEASE ORAL at 11:51

## 2020-04-26 RX ADMIN — DIVALPROEX SODIUM 500 MG: 250 TABLET, DELAYED RELEASE ORAL at 21:14

## 2020-04-26 RX ADMIN — TRAZODONE HYDROCHLORIDE 50 MG: 50 TABLET ORAL at 21:14

## 2020-04-26 RX ADMIN — ALUMINUM HYDROXIDE, MAGNESIUM HYDROXIDE, AND SIMETHICONE 30 ML: 200; 200; 20 SUSPENSION ORAL at 20:21

## 2020-04-26 RX ADMIN — DIVALPROEX SODIUM 500 MG: 250 TABLET, DELAYED RELEASE ORAL at 08:06

## 2020-04-26 RX ADMIN — VENLAFAXINE HYDROCHLORIDE 112.5 MG: 37.5 CAPSULE, EXTENDED RELEASE ORAL at 08:06

## 2020-04-26 ASSESSMENT — PAIN SCALES - GENERAL
PAINLEVEL_OUTOF10: 0
PAINLEVEL_OUTOF10: 0

## 2020-04-26 NOTE — PROGRESS NOTES
Karol Olszewski, MD, 500 mg at 04/26/20 5890    divalproex (DEPAKOTE) DR tablet 250 mg, 250 mg, Oral, Lunch, Karol Olszewski, MD, 250 mg at 04/26/20 1151    venlafaxine (EFFEXOR XR) extended release capsule 112.5 mg, 112.5 mg, Oral, Daily with breakfast, Karol Olszewski, MD, 112.5 mg at 04/26/20 0826    nicotine (NICODERM CQ) 21 MG/24HR 1 patch, 1 patch, Transdermal, Daily, Karol Olszewski, MD, 1 patch at 04/25/20 1818      Examination:  /69   Pulse 66   Temp 97.7 °F (36.5 °C) (Oral)   Resp 16   Ht 6' 1\" (1.854 m)   Wt 160 lb (72.6 kg)   SpO2 96%   BMI 21.11 kg/m²   Gait - steady   Medication side effects(SE): none    Mental Status Examination:    Level of consciousness:  within normal limits   Appearance:  good grooming and good hygiene  Behavior/Motor:  no abnormalities noted  Attitude toward examiner:  cooperative, attentive and good eye contact  Speech:  spontaneous, normal rate, normal volume and well articulated   Mood: decreased range and depressed but improving  Affect:  mood congruent, brighter  Thought processes:  linear, goal directed and coherent   Thought content:  Homocidal ideation denies  Suicidal Ideation:  denies suicidal ideation  Delusions:  no evidence of delusions  Perceptual Disturbance:  denies any perceptual disturbance  Cognition:  oriented to person, place, and time   Concentration distractible  Insight fair   Judgement fair     ASSESSMENT:   Patient symptoms are:  [] Well controlled  [x] Improving  [] Worsening  [] No change      Diagnosis:   Bipolar II disorder; depressed episode  Substance disorders:  Cannabis abuse    LABS:    Recent Labs     04/24/20  1547   WBC 5.4   HGB 15.0        Recent Labs     04/24/20  1547      K 4.6      CO2 25   BUN 13   CREATININE 0.8   GLUCOSE 93     Recent Labs     04/24/20  1547   BILITOT 1.0   ALKPHOS 67   AST 63*   *     Lab Results   Component Value Date    LABAMPH NOT DETECTED 04/24/2020    BARBSCNU NOT DETECTED 04/24/2020    LABBENZ NOT DETECTED 04/24/2020    LABMETH NOT DETECTED 04/24/2020    OPIATESCREENURINE NOT DETECTED 04/24/2020    PHENCYCLIDINESCREENURINE NOT DETECTED 04/24/2020    ETOH <10 04/24/2020     No results found for: TSH, FREET4  No results found for: LITHIUM  Lab Results   Component Value Date    VALPROATE 51 04/24/2020       RISK ASSESSMENT:   Suicide risk: moderate  Homicide risk: low  Violence risk: low  Elopement risk: low    Treatment Plan:  Reviewed current Medications with the patient. Will continue current medications  Risks, benefits, side effects, drug-to-drug interactions and alternatives to treatment were discussed. Collateral information: pending  CD evaluation pending  Encourage patient to attend group and other milieu activities.   Discharge planning discussed with the patient and treatment team.    PSYCHOTHERAPY/COUNSELING:  [x] Therapeutic interview  [x] Supportive  [] CBT  [] Ongoing  [] Other    [x] Patient continues to need, on a daily basis, active treatment furnished directly by or requiring the supervision of inpatient psychiatric personnel      Anticipated Length of stay: 5-7 days            Electronically signed by Lisbet Meza MD on 4/26/2020 at 2:31 PM

## 2020-04-26 NOTE — PLAN OF CARE
Problem: Depressive Behavior With or Without Suicide Precautions:  Goal: Absence of self-harm  Description: Absence of self-harm  Outcome: Met This Shift     Problem: Depressive Behavior With or Without Suicide Precautions:  Goal: Able to verbalize and/or display a decrease in depressive symptoms  Description: Able to verbalize and/or display a decrease in depressive symptoms  Outcome: Ongoing   pt denies si/hi and hallucinations. Pt states he feels like he found his calling in life and wants to go back to [employer] to become a . Pt out on the unit and is social with peers. Pt takes med's and attends groups.

## 2020-04-26 NOTE — PROGRESS NOTES
Pt woke and asked if he could call his Mom . Stated he just needed to hear her voice and tell he he loved her. Pt was flat. Granted him this request and when he hung up he appeared happier. Pt continued to talk to this nurse about his best  friend dying two years ago and his other friend abandoning him. Spoke of finding out that he was adopted and later meeting his bio parents (drug addict and convict). Pt states the last few years have been rough but he was doing ok until the GF broke up with him and cut all contact. She is 16 wks pregnant with his kid. Pt feels hurt. States he had the feelings to kill self but is aware that it would have been a mistake. Pt attended group earlier and said he was able to talk to another peer who is going thru a similar situation. Stated it helped him. Pt currently denies SI, HI, and AVH. Pt returned to his room. Will continue to monitor.

## 2020-04-26 NOTE — PROGRESS NOTES
Attended community meeting shared goal for the day as to try to manage my highs and lows, call my sister and let her know I am here for her.

## 2020-04-27 PROCEDURE — 99232 SBSQ HOSP IP/OBS MODERATE 35: CPT | Performed by: NURSE PRACTITIONER

## 2020-04-27 PROCEDURE — 1240000000 HC EMOTIONAL WELLNESS R&B

## 2020-04-27 PROCEDURE — 6370000000 HC RX 637 (ALT 250 FOR IP): Performed by: PSYCHIATRY & NEUROLOGY

## 2020-04-27 RX ORDER — NICOTINE 21 MG/24HR
1 PATCH, TRANSDERMAL 24 HOURS TRANSDERMAL DAILY
Status: DISCONTINUED | OUTPATIENT
Start: 2020-04-27 | End: 2020-04-29 | Stop reason: HOSPADM

## 2020-04-27 RX ADMIN — VENLAFAXINE HYDROCHLORIDE 112.5 MG: 37.5 CAPSULE, EXTENDED RELEASE ORAL at 08:41

## 2020-04-27 RX ADMIN — DIVALPROEX SODIUM 250 MG: 250 TABLET, DELAYED RELEASE ORAL at 12:50

## 2020-04-27 RX ADMIN — DIVALPROEX SODIUM 500 MG: 250 TABLET, DELAYED RELEASE ORAL at 20:43

## 2020-04-27 RX ADMIN — MAGNESIUM HYDROXIDE 30 ML: 2400 SUSPENSION ORAL at 15:14

## 2020-04-27 RX ADMIN — DIVALPROEX SODIUM 500 MG: 250 TABLET, DELAYED RELEASE ORAL at 08:42

## 2020-04-27 ASSESSMENT — PAIN SCALES - GENERAL: PAINLEVEL_OUTOF10: 0

## 2020-04-27 NOTE — PROGRESS NOTES
[facility]  Day 3 Interdisciplinary Treatment Plan NOTE    Review Date & Time: 4/27/20 0900    Patient was in treatment team    Admission Type:   Admission Type: Involuntary(Pink slipped)    Reason for admission:  Reason for Admission: \"I sent a suicidal quote on social media\"    Estimated Length of Stay Update:  3-5 days  Estimated Discharge Date Update: 3-5 days    PATIENT STRENGTHS:  Patient Strengths Strengths: Positive Support, No significant Physical Illness  Patient Strengths and Limitations:Limitations: Multiple barriers to leisure interests  Addictive Behavior:Addictive Behavior  Do you have a history of Chemical Use?: No  Do you have a history of Alcohol Use?: No  Do you have a history of Street Drug Abuse?: Yes  Histroy of Prescripton Drug Abuse?: No  Medical Problems:  Past Medical History:   Diagnosis Date    Von Willebrand disease ([facility] [address]        Risk:  Fall RiskTotal: 65  Sam Scale Sam Scale Score: 22  BVC Total: 0  Change in scores none .  Changes to plan of Care  none    Status EXAM:   Status and Exam  Normal: No  Facial Expression: Worried, Exaggerated  Affect: Incongruent  Level of Consciousness: Alert  Mood:Normal: No  Mood: Anxious, Depressed, Sad, Worthless, low self-esteem  Motor Activity:Normal: No  Motor Activity: Increased  Interview Behavior: Cooperative, Impulsive  Preception: Pioneer to Person, Doc Quarto to Time, Pioneer to Place, Pioneer to Situation  Attention:Normal: No  Attention: Distractible  Thought Processes: Circumstantial  Thought Content:Normal: No  Thought Content: Preoccupations  Hallucinations: None  Delusions: No  Memory:Normal: Yes  Insight and Judgment: No  Insight and Judgment: Poor Judgment, Poor Insight  Present Suicidal Ideation: No  Present Homicidal Ideation: No    Daily Assessment Last Entry:   Daily Sleep (WDL): Within Defined Limits         Patient Currently in Pain: Denies  Daily Nutrition (WDL): Within Defined Limits    Patient

## 2020-04-27 NOTE — PROGRESS NOTES
Dalton Most denies any SI, HI, or nay Hallucinations at this time. Patient denies any depression or anxiety. Patient is taking his meds and going to groups. Patient states his meds are working better as his self esteem is better. Will continue to monitor.

## 2020-04-28 PROCEDURE — 1240000000 HC EMOTIONAL WELLNESS R&B

## 2020-04-28 PROCEDURE — 6370000000 HC RX 637 (ALT 250 FOR IP): Performed by: PSYCHIATRY & NEUROLOGY

## 2020-04-28 PROCEDURE — 99232 SBSQ HOSP IP/OBS MODERATE 35: CPT | Performed by: NURSE PRACTITIONER

## 2020-04-28 RX ADMIN — VENLAFAXINE HYDROCHLORIDE 112.5 MG: 37.5 CAPSULE, EXTENDED RELEASE ORAL at 09:10

## 2020-04-28 RX ADMIN — TRAZODONE HYDROCHLORIDE 50 MG: 50 TABLET ORAL at 20:32

## 2020-04-28 RX ADMIN — DIVALPROEX SODIUM 250 MG: 250 TABLET, DELAYED RELEASE ORAL at 12:20

## 2020-04-28 RX ADMIN — TRAZODONE HYDROCHLORIDE 50 MG: 50 TABLET ORAL at 00:49

## 2020-04-28 RX ADMIN — DIVALPROEX SODIUM 500 MG: 250 TABLET, DELAYED RELEASE ORAL at 09:10

## 2020-04-28 RX ADMIN — MAGNESIUM HYDROXIDE 30 ML: 2400 SUSPENSION ORAL at 11:23

## 2020-04-28 RX ADMIN — DIVALPROEX SODIUM 500 MG: 250 TABLET, DELAYED RELEASE ORAL at 20:32

## 2020-04-28 ASSESSMENT — PAIN SCALES - GENERAL
PAINLEVEL_OUTOF10: 0
PAINLEVEL_OUTOF10: 0

## 2020-04-28 NOTE — PROGRESS NOTES
Patient was in his room with a female patient, discovered during 15 minute rounds by staff. Patient was reminded of unit policy on no visiting other patients in their rooms. Patient states \"she needed someone to talk to\".  RN notified

## 2020-04-28 NOTE — PROGRESS NOTES
Patient was educated on unit policy that no other patient is to be in his room. Patient admitted he had Devorah in his room talking to her regarding her issues. Patient verbally stated understanding. Will continue to monitor.

## 2020-04-28 NOTE — CARE COORDINATION
SW spoke to pt regarding options for medication management and pt will follow up at [facility] in [facility] [place] for meds only as he does therapy with [facility].

## 2020-04-28 NOTE — GROUP NOTE
Group Therapy Note    Date: 4/28/2020    Group Start Time: 1000  Group End Time: 2020  Group Topic: Psychoeducation    [facility] ACUTE [facility] 1    Linh Steele, CTRS        Group Therapy Note      Number of participants: 12  Type of group: Psychoeducation  Mode of intervention: Education, Support, Socialization, Exploration, Clarifying, and Problem-solving  Topic: Tips for Time Management  Objective: Pt will identify components for an individualized time management system and to establish 1 immediate time management goal.          Patient's Goal:  \"Take time to write letter to girlfriend\"     Notes:  Pt was interactive during group sharing components for individual time management system. Pt was able to establish 1 immediate time management goal to work on during current treatment stay. Pt gave support and feedback to others. Status After Intervention:  Improved    Participation Level:  Active Listener and Interactive    Participation Quality: Appropriate, Attentive, Sharing and Supportive      Speech:  normal      Thought Process/Content: Logical      Affective Functioning: Congruent      Mood: euthymic      Level of consciousness:  Alert, Oriented x4 and Attentive      Response to Learning: Able to verbalize current knowledge/experience, Able to verbalize/acknowledge new learning, Able to retain information, Capable of insight, Able to change behavior and Progressing to goal      Endings: None Reported    Modes of Intervention: Education, Support, Socialization, Exploration, Clarifying and Problem-solving

## 2020-04-28 NOTE — PROGRESS NOTES
Attended afternoon meet and greet. Aware of evening changes and nurse assignment. Pleasant and social during leisure group. Was 1 of 11 in attendance.

## 2020-04-28 NOTE — PROGRESS NOTES
Smoking status: Current Every Day Smoker    Smokeless tobacco: Never Used   Substance and Sexual Activity    Alcohol use: No    Drug use: No    Sexual activity: Not on file   Lifestyle    Physical activity     Days per week: Not on file     Minutes per session: Not on file    Stress: Not on file   Relationships    Social connections     Talks on phone: Not on file     Gets together: Not on file     Attends Amish service: Not on file     Active member of club or organization: Not on file     Attends meetings of clubs or organizations: Not on file     Relationship status: Not on file    Intimate partner violence     Fear of current or ex partner: Not on file     Emotionally abused: Not on file     Physically abused: Not on file     Forced sexual activity: Not on file   Other Topics Concern    Not on file   Social History Narrative    Not on file           ROS:  [x] All negative/unchanged except if checked.  Explain positive(checked items) below:  [] Constitutional  [] Eyes  [] Ear/Nose/Mouth/Throat  [] Respiratory  [] CV  [] GI  []   [] Musculoskeletal  [] Skin/Breast  [] Neurological  [] Endocrine  [] Heme/Lymph  [] Allergic/Immunologic    Explanation:     MEDICATIONS:    Current Facility-Administered Medications:     nicotine (NICODERM CQ) 21 MG/24HR 1 patch, 1 patch, Transdermal, Daily, Laurent Cage MD, 1 patch at 04/28/20 0909    acetaminophen (TYLENOL) tablet 650 mg, 650 mg, Oral, Q6H PRN, Laurent Cage MD    hydrOXYzine (VISTARIL) capsule 50 mg, 50 mg, Oral, TID PRN, Laurent Cage MD    haloperidol lactate (HALDOL) injection 5 mg, 5 mg, Intramuscular, Q6H PRN **OR** haloperidol (HALDOL) tablet 5 mg, 5 mg, Oral, Q6H PRN, Laurent Cage MD    traZODone (DESYREL) tablet 50 mg, 50 mg, Oral, Nightly PRN, Laurent Cage MD, 50 mg at 04/28/20 0049    magnesium hydroxide (MILK OF MAGNESIA) 400 MG/5ML suspension 30 mL, 30 mL, Oral, Daily PRN, Laurent Cage MD, 30 mL at 04/28/20 1123  

## 2020-04-29 VITALS
WEIGHT: 160 LBS | SYSTOLIC BLOOD PRESSURE: 111 MMHG | BODY MASS INDEX: 21.2 KG/M2 | DIASTOLIC BLOOD PRESSURE: 60 MMHG | HEIGHT: 73 IN | HEART RATE: 62 BPM | OXYGEN SATURATION: 99 % | RESPIRATION RATE: 18 BRPM | TEMPERATURE: 98.6 F

## 2020-04-29 LAB — VALPROIC ACID LEVEL: 97 MCG/ML (ref 50–100)

## 2020-04-29 PROCEDURE — 99239 HOSP IP/OBS DSCHRG MGMT >30: CPT | Performed by: NURSE PRACTITIONER

## 2020-04-29 PROCEDURE — 36415 COLL VENOUS BLD VENIPUNCTURE: CPT

## 2020-04-29 PROCEDURE — 6370000000 HC RX 637 (ALT 250 FOR IP): Performed by: PSYCHIATRY & NEUROLOGY

## 2020-04-29 PROCEDURE — 80164 ASSAY DIPROPYLACETIC ACD TOT: CPT

## 2020-04-29 RX ORDER — DIVALPROEX SODIUM 250 MG/1
250 TABLET, DELAYED RELEASE ORAL
Qty: 30 TABLET | Refills: 0 | Status: SHIPPED | OUTPATIENT
Start: 2020-04-30 | End: 2021-07-31

## 2020-04-29 RX ORDER — DIVALPROEX SODIUM 500 MG/1
500 TABLET, DELAYED RELEASE ORAL 2 TIMES DAILY
Qty: 60 TABLET | Refills: 0 | Status: SHIPPED | OUTPATIENT
Start: 2020-04-29 | End: 2021-08-04

## 2020-04-29 RX ORDER — VENLAFAXINE HYDROCHLORIDE 37.5 MG/1
112.5 CAPSULE, EXTENDED RELEASE ORAL
Qty: 90 CAPSULE | Refills: 0 | Status: SHIPPED | OUTPATIENT
Start: 2020-04-30 | End: 2021-08-04

## 2020-04-29 RX ADMIN — DIVALPROEX SODIUM 250 MG: 250 TABLET, DELAYED RELEASE ORAL at 11:41

## 2020-04-29 RX ADMIN — VENLAFAXINE HYDROCHLORIDE 112.5 MG: 37.5 CAPSULE, EXTENDED RELEASE ORAL at 08:52

## 2020-04-29 RX ADMIN — DIVALPROEX SODIUM 500 MG: 250 TABLET, DELAYED RELEASE ORAL at 08:52

## 2020-04-29 ASSESSMENT — PAIN SCALES - GENERAL: PAINLEVEL_OUTOF10: 0

## 2020-04-29 NOTE — CARE COORDINATION
SW spoke to mother Skyla Romo she reported pt is doing so much better and they had a long talk related to how to continue to do well when discharged. Skyla Romo reports he is returning to her home and reports no weapons in the home. Skyla Romo also reported no other safety concerns.

## 2020-04-29 NOTE — DISCHARGE SUMMARY
DISCHARGE SUMMARY      Patient ID:  Dahlia Salazar  [id number]  37 y.o.  2000    Admit date: 4/24/2020    Discharge date and time: 4/29/2020    Admitting Physician: Yun Wren MD     Discharge Physician: Dr Sam Garcia MD    Admission Diagnoses: Depression, major, single episode [F32.9]  Depression, major, single episode [F32.9]    Admission Condition: poor    Discharged Condition: stable    Admission Circumstance: Wrist mood, suicidal ideation      PAST MEDICAL/PSYCHIATRIC HISTORY:   Past Medical History:   Diagnosis Date    [facility]        FAMILY/SOCIAL HISTORY:  History reviewed. No pertinent family history.   Social History     Socioeconomic History    Marital status: Single     Spouse name: Not on file    Number of children: Not on file    Years of education: Not on file    Highest education level: Not on file   Occupational History    Not on file   Social Needs    Financial resource strain: Not on file    Food insecurity     Worry: Not on file     Inability: Not on file    Transportation needs     Medical: Not on file     Non-medical: Not on file   Tobacco Use    Smoking status: Current Every Day Smoker    Smokeless tobacco: Never Used   Substance and Sexual Activity    Alcohol use: No    Drug use: No    Sexual activity: Not on file   Lifestyle    Physical activity     Days per week: Not on file     Minutes per session: Not on file    Stress: Not on file   Relationships    Social connections     Talks on phone: Not on file     Gets together: Not on file     Attends Sikhism service: Not on file     Active member of club or organization: Not on file     Attends meetings of clubs or organizations: Not on file     Relationship status: Not on file    Intimate partner violence     Fear of current or ex partner: Not on file     Emotionally abused: Not on file     Physically abused: Not on file     Forced sexual activity: Not on file   Other Topics Concern    Not on file 6' 1\" (1.854 m)   Wt 160 lb (72.6 kg)   SpO2 99%   BMI 21.11 kg/m²   Gait - steady    HOSPITAL COURSE[de-identified]  Following admission to the hospital, patient had a complete physical exam and blood work up. The patient is a 20 y.o. male with significant past history of Bipolar Disorder, who was brought to the ER by Police for suicidal ideations. When interviewed today, the patient said he had come to the hospital because 'stuff was going on at home'. He said he has a child on the way, and his girlfriend had left him; he also had not been able to reach a Psychiatrist or a therapist in the past couple of months, and his family support system was good for him. The stress was triggered both by his breakup with his girlfriend who is pregnant, the fact that he had recently gotten in touch with his biological family, and because of his father's reaction to him having a child on the way. He said he had been feeling depressed \"for a while\", about 4-5 years. He said the depression started with the death of his best friend, who had  of suicide. He said he had not blamed himself for the friend's death, but rather was upset because he could not see him and properly say goodbye to him prior to his death. The patient was initiated on Depakote and Effexor and these medications were titrated per clinical effectiveness. The patient was agreeable to this medication therapy, gave consent to the treatment, and was compliant. Once the medication started to take effect and the patient's depressive symptoms started to decrease he became social on the unit and with peers and started to attend groups. The patient was hyperverbal at times when talking about his girlfriend who was pregnant. The patient felt that the medications he was prescribed were working very well for him. The patient reported a significant increase in the quality of sleep and does not appear to be having any neurovegetative signs of depression at this time.   The patient is future oriented and looking forward to being a father. At time of discharge the patient vehemently denied suicidal homicidal ideations or intent to harm. The patient did not display any overt or covert signs and symptoms of psychosis. The patient does not exhibit any signs and symptoms of neuro degenerative signs of depression. The patient stated that he learned a significant amount of information's from group and learned many coping skills which he will apply to his daily life and will use these coping mechanisms to help him with any stress that might come his way. The patient was highly appreciative for the help that we gave him and states that we get him on the right track so he can be a good father to his unborn child. The patient was also grateful because we were able to find a good medication regimen for him and he is eager to continue to take medications and remain mentally stable. The patient will follow up on outpatient services to continue with medication management and counseling if needed. The patient has no more suicidal, homicidal, psychotic, or manic symptomology. The patient received the required treatment with medication, participated in group milieu, remained engaged in unit activities, and learned appropriate coping skills. The patient was seen watching TV, playing games, socializing with peers, and calling friends and family. There were no mention or gestures of self-harm or harm to others. The patient's mental status returned to baseline. Treatment team felt that the patient has obtained maximal benefit out of this hospitalization does not meet the criteria for inpatient hospitalization anymore. However the patient will continue to benefit from outpatient and follow-up treatment to maintain stability. Collateral information was obtained and reconciled, there were no concerns about the patient's safety. The patient has no access to guns or weapons at this time.   The Flask  4/29/2020  4:57 PM

## 2020-04-29 NOTE — PROGRESS NOTES
CLINICAL PHARMACY NOTE: MEDS TO [address] Select Patient?: No  Total # of Prescriptions Filled: 3   The following medications were delivered to the patient:  · Divalproex 500  · Divalproex 250 dr  · Venlafaxine er 37.5  Total # of Interventions Completed: 3  Time Spent (min): 30    Additional Documentation:

## 2020-04-29 NOTE — CARE COORDINATION
In order to ensure appropriate transition and discharge planning is in place, the following documents have been transmitted to Shonna Dobbins and [facility], as the new outpatient provider:     · The d/c diagnosis was transmitted to the next care provider  · The reason for hospitalization was transmitted to the next care provider  · The d/c medications (dosage and indication) were transmitted to the next care provider   · The continuing care plan was transmitted to the next care provider

## 2020-12-30 NOTE — PLAN OF CARE
Problem: Depressive Behavior With or Without Suicide Precautions:  Goal: Absence of self-harm  Description: Absence of self-harm  Outcome: Met This Shift     Problem: Depressive Behavior With or Without Suicide Precautions:  Goal: Able to verbalize and/or display a decrease in depressive symptoms  Description: Able to verbalize and/or display a decrease in depressive symptoms  Outcome: Ongoing     Georgia Noeler denies any SI, HI, or any Hallucinations at this time. Patient denies depression or anxiety. Georgia Rowe states \"I feel spectacular today\" \"Mentally stable\" \"Getting help when needed\". Patient is still preoccupied with his ex girlfriend and the pregnancy but organizing his thought better on the situation. Patient is taking his meds and groups are encouraged. Will continue  to monitor. Roula Alonso(Attending)

## 2021-07-31 ENCOUNTER — HOSPITAL ENCOUNTER (EMERGENCY)
Age: 21
Discharge: HOME | End: 2021-07-31
Attending: FAMILY MEDICINE
Payer: COMMERCIAL

## 2021-07-31 ENCOUNTER — APPOINTMENT (OUTPATIENT)
Dept: GENERAL RADIOLOGY | Age: 21
End: 2021-07-31
Payer: COMMERCIAL

## 2021-07-31 VITALS
OXYGEN SATURATION: 97 % | RESPIRATION RATE: 16 BRPM | HEIGHT: 73 IN | TEMPERATURE: 98 F | DIASTOLIC BLOOD PRESSURE: 98 MMHG | BODY MASS INDEX: 23.86 KG/M2 | SYSTOLIC BLOOD PRESSURE: 146 MMHG | WEIGHT: 180 LBS | HEART RATE: 74 BPM

## 2021-07-31 DIAGNOSIS — S83.92XA SPRAIN OF LEFT KNEE, UNSPECIFIED LIGAMENT, INITIAL ENCOUNTER: ICD-10-CM

## 2021-07-31 DIAGNOSIS — M25.462 KNEE EFFUSION, LEFT: Primary | ICD-10-CM

## 2021-07-31 PROCEDURE — 99283 EMERGENCY DEPT VISIT LOW MDM: CPT

## 2021-07-31 PROCEDURE — 73564 X-RAY EXAM KNEE 4 OR MORE: CPT

## 2021-07-31 PROCEDURE — 6370000000 HC RX 637 (ALT 250 FOR IP): Performed by: FAMILY MEDICINE

## 2021-07-31 RX ORDER — ACETAMINOPHEN 500 MG
1000 TABLET ORAL ONCE
Status: COMPLETED | OUTPATIENT
Start: 2021-07-31 | End: 2021-07-31

## 2021-07-31 RX ORDER — ACETAMINOPHEN 500 MG
1000 TABLET ORAL EVERY 8 HOURS PRN
Qty: 50 TABLET | Refills: 0 | Status: SHIPPED | OUTPATIENT
Start: 2021-07-31 | End: 2022-08-21

## 2021-07-31 RX ADMIN — ACETAMINOPHEN 1000 MG: 500 TABLET, FILM COATED ORAL at 10:23

## 2021-07-31 ASSESSMENT — PAIN DESCRIPTION - LOCATION: LOCATION: KNEE

## 2021-07-31 ASSESSMENT — PAIN DESCRIPTION - DESCRIPTORS: DESCRIPTORS: BURNING

## 2021-07-31 ASSESSMENT — PAIN DESCRIPTION - PROGRESSION: CLINICAL_PROGRESSION: GRADUALLY WORSENING

## 2021-07-31 ASSESSMENT — PAIN DESCRIPTION - PAIN TYPE: TYPE: ACUTE PAIN

## 2021-07-31 ASSESSMENT — PAIN DESCRIPTION - FREQUENCY: FREQUENCY: CONTINUOUS

## 2021-07-31 ASSESSMENT — PAIN DESCRIPTION - ORIENTATION: ORIENTATION: LEFT

## 2021-07-31 ASSESSMENT — PAIN DESCRIPTION - ONSET: ONSET: SUDDEN

## 2021-07-31 ASSESSMENT — PAIN SCALES - GENERAL: PAINLEVEL_OUTOF10: 9

## 2021-08-02 NOTE — PROGRESS NOTES
Dr Priyanka Rome reviewed chart would like to see patient in office this week. Phoned patient no answer a voicemail was left for him to return the call and schedule appointment this week.

## 2021-08-04 ENCOUNTER — OFFICE VISIT (OUTPATIENT)
Dept: ORTHOPEDIC SURGERY | Age: 21
End: 2021-08-04
Payer: COMMERCIAL

## 2021-08-04 VITALS — RESPIRATION RATE: 18 BRPM | BODY MASS INDEX: 24.52 KG/M2 | WEIGHT: 185 LBS | HEIGHT: 73 IN

## 2021-08-04 DIAGNOSIS — M25.562 ACUTE PAIN OF LEFT KNEE: Primary | ICD-10-CM

## 2021-08-04 PROCEDURE — 99213 OFFICE O/P EST LOW 20 MIN: CPT | Performed by: ORTHOPAEDIC SURGERY
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
## 2021-08-05 ENCOUNTER — HOSPITAL ENCOUNTER (OUTPATIENT)
Dept: MRI IMAGING | Age: 21
Discharge: HOME | End: 2021-08-07
Payer: COMMERCIAL

## 2021-08-05 DIAGNOSIS — M25.562 ACUTE PAIN OF LEFT KNEE: ICD-10-CM

## 2021-08-05 PROCEDURE — 73721 MRI JNT OF LWR EXTRE W/O DYE: CPT

## 2022-06-17 NOTE — PROGRESS NOTES
[facility]  Day 3 Interdisciplinary Treatment Plan NOTE    Review Date & Time: 4/29/2020 1000    Patient was in treatment team    Admission Type:   Admission Type: Involuntary(Pink slipped)    Reason for admission:  Reason for Admission: \"I sent a suicidal quote on social media\"    Estimated Length of Stay Update:  2-4 days  Estimated Discharge Date Update: 5/2/2020    PATIENT STRENGTHS:  Patient Strengths Strengths: Positive Support, No significant Physical Illness  Patient Strengths and Limitations:Limitations: Multiple barriers to leisure interests  Addictive Behavior:Addictive Behavior  Do you have a history of Chemical Use?: No  Do you have a history of Alcohol Use?: No  Do you have a history of Street Drug Abuse?: Yes  Histroy of Prescripton Drug Abuse?: No  Medical Problems:  Past Medical History:   Diagnosis Date    [facility]        Risk:  Fall RiskTotal: 65  Sam Scale Sam Scale Score: 22  BVC Total: 0  Change in scores no.  Changes to plan of Care no    Status EXAM:   Status and Exam  Normal: No  Facial Expression: Worried, Sad  Affect: Appropriate  Level of Consciousness: Alert  Mood:Normal: No  Mood: Sad, Anxious  Motor Activity:Normal: No  Motor Activity: Increased  Interview Behavior: Cooperative  Preception: [place] to Person, [place] to Time, [place] to Place, [place] to Situation  Attention:Normal: No  Attention: Distractible  Thought Processes: Circumstantial  Thought Content:Normal: No  Thought Content: Preoccupations  Hallucinations: None  Delusions: No  Memory:Normal: Yes  Insight and Judgment: No  Insight and Judgment: Poor Insight, Poor Judgment  Present Suicidal Ideation: No  Present Homicidal Ideation: No    Daily Assessment Last Entry:   Daily Sleep (WDL): Within Defined Limits         Patient Currently in Pain: Denies  Daily Nutrition (WDL): Within Defined Limits    Patient Monitoring:  Frequency of Checks: 4 times per hour, close    Psychiatric Anesthesia Post Evaluation    Patient: Isauro Spivey    Procedure(s) Performed: Procedure(s) (LRB):  RIGHT MYRINGOTOMY, WITH TYMPANOSTOMY TUBE INSERTION (Right)    Final Anesthesia Type: general      Patient location during evaluation: PACU  Patient participation: Yes- Able to Participate  Level of consciousness: awake and sedated  Post-procedure vital signs: reviewed and stable  Pain management: adequate  Airway patency: patent    PONV status at discharge: No PONV  Anesthetic complications: no      Cardiovascular status: blood pressure returned to baseline  Respiratory status: unassisted  Hydration status: euvolemic  Follow-up not needed.          Vitals Value Taken Time   /68 06/17/22 1131   Temp 36.7 °C (98.1 °F) 06/17/22 1024   Pulse 68 06/17/22 1145   Resp 17 06/17/22 1145   SpO2 97 % 06/17/22 1145         Event Time   Out of Recovery 10:53:00         Pain/Lora Score: Lora Score: 10 (6/17/2022 10:53 AM)

## 2022-08-21 ENCOUNTER — HOSPITAL ENCOUNTER (EMERGENCY)
Age: 22
Discharge: HOME | End: 2022-08-21
Payer: COMMERCIAL

## 2022-08-21 VITALS
WEIGHT: 185 LBS | HEART RATE: 69 BPM | SYSTOLIC BLOOD PRESSURE: 147 MMHG | DIASTOLIC BLOOD PRESSURE: 80 MMHG | TEMPERATURE: 97 F | BODY MASS INDEX: 24.41 KG/M2 | OXYGEN SATURATION: 98 % | RESPIRATION RATE: 16 BRPM

## 2022-08-21 DIAGNOSIS — R59.9 ENLARGED LYMPH NODE: Primary | ICD-10-CM

## 2022-08-21 LAB
ANION GAP SERPL CALCULATED.3IONS-SCNC: 12 MMOL/L (ref 7–16)
ATYPICAL LYMPHOCYTE RELATIVE PERCENT: 13 % (ref 0–4)
BASOPHILS ABSOLUTE: 0.22 E9/L (ref 0–0.2)
BASOPHILS RELATIVE PERCENT: 2 % (ref 0–2)
BUN BLDV-MCNC: 11 MG/DL (ref 6–20)
CALCIUM SERPL-MCNC: 9.6 MG/DL (ref 8.6–10.2)
CHLORIDE BLD-SCNC: 100 MMOL/L (ref 98–107)
CO2: 26 MMOL/L (ref 22–29)
CREAT SERPL-MCNC: 0.9 MG/DL (ref 0.7–1.2)
EOSINOPHILS ABSOLUTE: 0.11 E9/L (ref 0.05–0.5)
EOSINOPHILS RELATIVE PERCENT: 1 % (ref 0–6)
GFR AFRICAN AMERICAN: >60
GFR NON-AFRICAN AMERICAN: >60 ML/MIN/1.73
GLUCOSE BLD-MCNC: 91 MG/DL (ref 74–99)
HCT VFR BLD CALC: 45.1 % (ref 37–54)
HEMOGLOBIN: 15.7 G/DL (ref 12.5–16.5)
INFLUENZA A: NOT DETECTED
INFLUENZA B: NOT DETECTED
LYMPHOCYTES ABSOLUTE: 6.48 E9/L (ref 1.5–4)
LYMPHOCYTES RELATIVE PERCENT: 47 % (ref 20–42)
MCH RBC QN AUTO: 32.8 PG (ref 26–35)
MCHC RBC AUTO-ENTMCNC: 34.8 % (ref 32–34.5)
MCV RBC AUTO: 94.2 FL (ref 80–99.9)
MONOCYTES ABSOLUTE: 1.3 E9/L (ref 0.1–0.95)
MONOCYTES RELATIVE PERCENT: 12 % (ref 2–12)
NEUTROPHILS ABSOLUTE: 2.7 E9/L (ref 1.8–7.3)
NEUTROPHILS RELATIVE PERCENT: 25 % (ref 43–80)
PDW BLD-RTO: 12.8 FL (ref 11.5–15)
PLATELET # BLD: 208 E9/L (ref 130–450)
PMV BLD AUTO: 10.1 FL (ref 7–12)
POTASSIUM REFLEX MAGNESIUM: 4.4 MMOL/L (ref 3.5–5)
RBC # BLD: 4.79 E12/L (ref 3.8–5.8)
RBC # BLD: NORMAL 10*6/UL
SARS-COV-2 RNA, RT PCR: NOT DETECTED
SODIUM BLD-SCNC: 138 MMOL/L (ref 132–146)
STREP GRP A PCR: NEGATIVE
WBC # BLD: 10.8 E9/L (ref 4.5–11.5)

## 2022-08-21 PROCEDURE — 87880 STREP A ASSAY W/OPTIC: CPT

## 2022-08-21 PROCEDURE — 80048 BASIC METABOLIC PNL TOTAL CA: CPT

## 2022-08-21 PROCEDURE — 87636 SARSCOV2 & INF A&B AMP PRB: CPT

## 2022-08-21 PROCEDURE — 36415 COLL VENOUS BLD VENIPUNCTURE: CPT

## 2022-08-21 PROCEDURE — 99283 EMERGENCY DEPT VISIT LOW MDM: CPT

## 2022-08-21 PROCEDURE — 85025 COMPLETE CBC W/AUTO DIFF WBC: CPT

## 2022-08-21 PROCEDURE — 6370000000 HC RX 637 (ALT 250 FOR IP): Performed by: NURSE PRACTITIONER

## 2022-08-21 RX ORDER — ACETAMINOPHEN 500 MG
1000 TABLET ORAL ONCE
Status: COMPLETED | OUTPATIENT
Start: 2022-08-21 | End: 2022-08-21

## 2022-08-21 RX ADMIN — ACETAMINOPHEN 1000 MG: 500 TABLET ORAL at 17:34

## 2022-08-21 ASSESSMENT — PAIN SCALES - GENERAL: PAINLEVEL_OUTOF10: 5

## 2022-08-21 ASSESSMENT — PAIN DESCRIPTION - FREQUENCY: FREQUENCY: CONTINUOUS

## 2022-08-21 ASSESSMENT — PAIN DESCRIPTION - LOCATION: LOCATION: NECK

## 2022-08-21 ASSESSMENT — PAIN - FUNCTIONAL ASSESSMENT: PAIN_FUNCTIONAL_ASSESSMENT: 0-10

## 2022-08-21 ASSESSMENT — PAIN DESCRIPTION - DESCRIPTORS: DESCRIPTORS: SORE;DISCOMFORT

## 2022-08-21 ASSESSMENT — PAIN DESCRIPTION - PAIN TYPE: TYPE: ACUTE PAIN

## 2022-08-21 NOTE — ED PROVIDER NOTES
10.2 mg/dL       RADIOLOGY:  Interpreted by Radiologist.  No orders to display       ------------------------- NURSING NOTES AND VITALS REVIEWED ---------------------------   The nursing notes within the ED encounter and vital signs as below have been reviewed. BP (!) 147/80   Pulse 69   Temp 97 °F (36.1 °C)   Resp 16   Wt 185 lb (83.9 kg)   SpO2 98%   BMI 24.41 kg/m²   Oxygen Saturation Interpretation: Normal      ---------------------------------------------------PHYSICAL EXAM--------------------------------------    Constitutional/General: Alert and oriented x3, well appearing, non toxic in NAD, pleasant  Head: Normocephalic and atraumatic  Eyes: PERRL, EOMI, conjunctiva normal, sclera non icteric, no eye drainage  Ears: TM normal, no hemotympanums, no ear bleeding or drainage, no mastoid tenderness or erythema. No pain with palpation of pinna or tragus. Mouth: Oropharynx clear, without erythema, handling secretions, no trismus, no asymmetry of the posterior oropharynx or uvular edema. No tongue/lip swelling. Uvula midline. Tonsils without exudates or erythema. No tonsillar swelling. Dentition normal.  Neck: Supple, full ROM, non tender to palpation in the midline, no stridor, no crepitus, no meningeal signs. Anterior cervical lymph node which is tender to palpation and freely movable. No lymphadenopathy otherwise. Respiratory: Lungs clear to auscultation bilaterally, no wheezes, rales, or rhonchi. Not in respiratory distress. Respirations easy and unlabored. Cardiovascular:  S1S2. Regular rate. Regular rhythm. No murmurs, gallops, or rubs. 2+ distal pulses  Chest: No chest wall tenderness  GI:  Abdomen Soft, Non tender, Non distended. +BS x 4 quadrants. No organomegaly, no palpable masses,  No rebound, guarding, or rigidity. Musculoskeletal: Moves all extremities x 4. Warm and well perfused, no clubbing, cyanosis, or edema. Capillary refill <3 seconds  Integument: skin warm and dry.  No rashes. Lymphatic: no lymphadenopathy noted  Neurologic: GCS 15, no focal deficits, symmetric strength 5/5 in the upper and lower extremities bilaterally  Psychiatric: Normal Affect      ------------------------------ ED COURSE/MEDICAL DECISION MAKING----------------------  Medications   acetaminophen (TYLENOL) tablet 1,000 mg (1,000 mg Oral Given 8/21/22 1734)            Medical Decision Making: At this time the patient is without objective evidence of an acute process requiring hospitalization or inpatient management. They have remained hemodynamically stable throughout their entire ED visit and are stable for discharge with outpatient follow up. The plan has been discussed in detail and they are aware of the specific conditions for emergent return, as well as the importance of follow-up. Patient is a 59-year-old male presenting to the emergency department today for evaluation of cervical anterior lymphadenopathy and experiencing chills after recently cutting out marijuana use. He has no other reported complaints currently. His physical examination is within normal limits. Rapid strep negative. Rapid COVID and influenza negative. CBC, BMP within normal limits. Patient advised at this time neuropathy likely related to inflammation due to recent infection. Reassuring that the area is tender and freely movable. Patient advised he needs to follow-up with a primary care provider for further evaluation management. Advised if symptoms worsen or if new symptoms develop patient return to the emergency department for evaluation. Patient verbalizes understanding. Counseling: The emergency provider has spoken with the patient/ parent and discussed todays results, in addition to providing specific details for the plan of care and counseling regarding the diagnosis and prognosis.   Questions are answered at this time and they are agreeable with the plan.      ---------------------------------
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
## 2023-03-23 ENCOUNTER — HOSPITAL ENCOUNTER (EMERGENCY)
Age: 23
Discharge: HOME | End: 2023-03-23
Attending: EMERGENCY MEDICINE
Payer: COMMERCIAL

## 2023-03-23 VITALS
TEMPERATURE: 98.1 F | HEART RATE: 82 BPM | SYSTOLIC BLOOD PRESSURE: 137 MMHG | DIASTOLIC BLOOD PRESSURE: 77 MMHG | BODY MASS INDEX: 28.37 KG/M2 | OXYGEN SATURATION: 96 % | WEIGHT: 215 LBS | RESPIRATION RATE: 16 BRPM

## 2023-03-23 DIAGNOSIS — R04.0 EPISTAXIS: Primary | ICD-10-CM

## 2023-03-23 PROCEDURE — 99283 EMERGENCY DEPT VISIT LOW MDM: CPT

## 2023-03-23 RX ORDER — ECHINACEA PURPUREA EXTRACT 125 MG
1 TABLET ORAL PRN
Qty: 104 ML | Refills: 0 | Status: SHIPPED | OUTPATIENT
Start: 2023-03-23 | End: 2023-04-22

## 2023-03-23 ASSESSMENT — PAIN - FUNCTIONAL ASSESSMENT: PAIN_FUNCTIONAL_ASSESSMENT: NONE - DENIES PAIN

## 2023-03-23 NOTE — Clinical Note
Tracy Monique was seen and treated in our emergency department on 3/23/2023. He may return to work on 03/23/2023. If you have any questions or concerns, please don't hesitate to call.       Vanessa Badillo, DO

## 2023-03-23 NOTE — Clinical Note
Pete Carrion was seen and treated in our emergency department on 3/23/2023. He may return to work on 03/23/2023. If you have any questions or concerns, please don't hesitate to call.       Tate Duncan, DO

## 2023-03-23 NOTE — LETTER
[address] [facility] Emergency Department  [id number] [facility] [address]  Phone: [phone]               March 23, 2023    Patient: Delilah March   YOB: 2000   Date of Visit: 3/23/2023       To Whom It May Concern:    Paulina Jacobs was seen and treated in our emergency department on 3/23/2023. He may return to work 3/24/2023.       Sincerely,       Jacki Sotelo RN         Signature:__________________________________

## 2023-03-26 NOTE — ED PROVIDER NOTES
[address] ENCOUNTER        Pt Name: Sakshi Morris  MRN: [id number]  [place] 2000  Date of evaluation: 3/23/2023  Provider: Alexander Paul DO  PCP: Soni Landry MD  Note Started: 4:00 PM EDT 3/26/23    CHIEF COMPLAINT       Chief Complaint   Patient presents with    Epistaxis     10 minute nosebleed earlier today, right nare. HISTORY OF PRESENT ILLNESS: 1 or more Elements       Sakshi Morris is a 20 y.o. male who presents presents to the emergency department with a chief complaint of epistaxis. The patient did attend an episode of epistaxis earlier today. Symptoms are mild in severity. Improved with pressure. Present nothing made worse. No history of epistaxis. No facial trauma. There are no associated fevers, chills, lightheadedness, nasal congestion, chest pain, shortness of breath, cough, nausea, vomiting, abdominal pain, diarrhea, constipation, dysuria or hematuria. The patient has no other stated complaints at this time. Nursing Notes were all reviewed and agreed with or any disagreements were addressed in the HPI. REVIEW OF SYSTEMS :      Review of Systems    Positives and Pertinent negatives as per HPI. SURGICAL HISTORY     Past Surgical History:   Procedure Laterality Date    TONSILLECTOMY         CURRENTMEDICATIONS       Discharge Medication List as of 3/23/2023 11:36 AM        CONTINUE these medications which have NOT CHANGED    Details   divalproex (DEPAKOTE) 500 MG DR tablet Take 1 tablet by mouth 2 times daily, Disp-60 tablet, R-0Normal      venlafaxine (EFFEXOR XR) 37.5 MG extended release capsule Take 3 capsules by mouth daily (with breakfast), Disp-90 capsule, R-0Normal             ALLERGIES     Advil [ibuprofen], Asa [aspirin], and Nsaids    FAMILYHISTORY     History reviewed. No pertinent family history.      SOCIAL HISTORY       Social History     Tobacco Use    Smoking status: Every results found. PROCEDURES   Unless otherwise noted below, none       MDM:   I, Dr. Lakhwinder Benavidez am the primary physician of record. Blake Mabry is a 20 y.o. male who presents to the ED for epistaxis  Vital signs upon arrival /77   Pulse 82   Temp 98.1 °F (36.7 °C) (Oral)   Resp 16   Wt 215 lb (97.5 kg)   SpO2 96%   BMI 28.37 kg/m²     History From: The patient    Independent Historian: None    Limitations to history: None      History: The patient presents presents to the emergency department with a chief complaint of epistaxis. The patient did attend an episode of epistaxis earlier today. Symptoms are mild in severity. Improved with pressure. Present nothing made worse. No history of epistaxis. No facial trauma. There are no associated fevers, chills, lightheadedness, nasal congestion, chest pain, shortness of breath, cough, nausea, vomiting, abdominal pain, diarrhea, constipation, dysuria or hematuria. The patient has no other stated complaints at this time. Physical exam: Patient sitting in the bed no acute distress. Heart regular rate and rhythm, lungs clear to auscultation bilaterally scant blood present in right nare, no visible vessel or cautery    Risk/Disposition: Patient presenting to emergency department the chief complaint of left axis. This improved after a minute pressure. The patient be referred to ENT as given saline spray. He will be discharged and follow-up outpatient. Pt will be d/c and will follow up with his PCP . He is educated on signs and symptoms that require emergent evaluation. Pt is advised to return to the ED if his symptoms change or worsen. If his pain persists, pt may need further evaluation. Pt is agreeable to plan and all questions have been answered at this time.       PATIENT REFERRED TO:  MD Jeanine Khan [address]  [address] [facility] [address] [address]  [phone]    In 2 days      [facility], [address]

## 2023-06-28 ENCOUNTER — HOSPITAL ENCOUNTER (EMERGENCY)
Age: 23
Discharge: LEFT BEFORE DISCHARGE | End: 2023-06-28
Attending: EMERGENCY MEDICINE
Payer: COMMERCIAL

## 2023-06-28 ENCOUNTER — APPOINTMENT (OUTPATIENT)
Dept: CT IMAGING | Age: 23
End: 2023-06-28
Attending: EMERGENCY MEDICINE
Payer: COMMERCIAL

## 2023-06-28 VITALS
SYSTOLIC BLOOD PRESSURE: 131 MMHG | TEMPERATURE: 98.1 F | OXYGEN SATURATION: 100 % | HEART RATE: 67 BPM | DIASTOLIC BLOOD PRESSURE: 76 MMHG | HEIGHT: 73 IN | WEIGHT: 190 LBS | BODY MASS INDEX: 25.18 KG/M2 | RESPIRATION RATE: 16 BRPM

## 2023-06-28 DIAGNOSIS — K62.5 RECTAL BLEEDING: Primary | ICD-10-CM

## 2023-06-28 LAB
ALBUMIN SERPL-MCNC: 4.9 G/DL (ref 3.5–5.2)
ALP SERPL-CCNC: 62 U/L (ref 40–129)
ALT SERPL-CCNC: 32 U/L (ref 0–40)
ANION GAP SERPL CALCULATED.3IONS-SCNC: 7 MMOL/L (ref 7–16)
AST SERPL-CCNC: 25 U/L (ref 0–39)
BASOPHILS # BLD: 0.06 E9/L (ref 0–0.2)
BASOPHILS NFR BLD: 1.1 % (ref 0–2)
BILIRUB SERPL-MCNC: 0.4 MG/DL (ref 0–1.2)
BUN SERPL-MCNC: 17 MG/DL (ref 6–20)
CALCIUM SERPL-MCNC: 9.2 MG/DL (ref 8.6–10.2)
CHLORIDE SERPL-SCNC: 103 MMOL/L (ref 98–107)
CO2 SERPL-SCNC: 30 MMOL/L (ref 22–29)
CREAT SERPL-MCNC: 0.9 MG/DL (ref 0.7–1.2)
EOSINOPHIL # BLD: 0.32 E9/L (ref 0.05–0.5)
EOSINOPHIL NFR BLD: 5.9 % (ref 0–6)
ERYTHROCYTE [DISTWIDTH] IN BLOOD BY AUTOMATED COUNT: 12.6 FL (ref 11.5–15)
GLUCOSE SERPL-MCNC: 74 MG/DL (ref 74–99)
HCT VFR BLD AUTO: 45.4 % (ref 37–54)
HGB BLD-MCNC: 14.6 G/DL (ref 12.5–16.5)
IMM GRANULOCYTES # BLD: 0.01 E9/L
IMM GRANULOCYTES NFR BLD: 0.2 % (ref 0–5)
LACTATE BLDV-SCNC: 1 MMOL/L (ref 0.5–1.9)
LYMPHOCYTES # BLD: 3.05 E9/L (ref 1.5–4)
LYMPHOCYTES NFR BLD: 56.6 % (ref 20–42)
MCH RBC QN AUTO: 30.9 PG (ref 26–35)
MCHC RBC AUTO-ENTMCNC: 32.2 % (ref 32–34.5)
MCV RBC AUTO: 96.2 FL (ref 80–99.9)
MONOCYTES # BLD: 0.53 E9/L (ref 0.1–0.95)
MONOCYTES NFR BLD: 9.8 % (ref 2–12)
NEUTROPHILS # BLD: 1.42 E9/L (ref 1.8–7.3)
NEUTS SEG NFR BLD: 26.4 % (ref 43–80)
PLATELET # BLD AUTO: 213 E9/L (ref 130–450)
PMV BLD AUTO: 10.9 FL (ref 7–12)
POTASSIUM SERPL-SCNC: 4.9 MMOL/L (ref 3.5–5)
PROT SERPL-MCNC: 7.4 G/DL (ref 6.4–8.3)
RBC # BLD AUTO: 4.72 E12/L (ref 3.8–5.8)
SODIUM SERPL-SCNC: 140 MMOL/L (ref 132–146)
WBC # BLD: 5.4 E9/L (ref 4.5–11.5)

## 2023-06-28 PROCEDURE — 99285 EMERGENCY DEPT VISIT HI MDM: CPT

## 2023-06-28 PROCEDURE — 80053 COMPREHEN METABOLIC PANEL: CPT

## 2023-06-28 PROCEDURE — 2580000003 HC RX 258: Performed by: RADIOLOGY

## 2023-06-28 PROCEDURE — 83605 ASSAY OF LACTIC ACID: CPT

## 2023-06-28 PROCEDURE — 85025 COMPLETE CBC W/AUTO DIFF WBC: CPT

## 2023-06-28 PROCEDURE — 6360000004 HC RX CONTRAST MEDICATION: Performed by: RADIOLOGY

## 2023-06-28 PROCEDURE — 74177 CT ABD & PELVIS W/CONTRAST: CPT

## 2023-06-28 RX ORDER — SODIUM CHLORIDE 0.9 % (FLUSH) 0.9 %
10 SYRINGE (ML) INJECTION ONCE
Status: COMPLETED | OUTPATIENT
Start: 2023-06-28 | End: 2023-06-28

## 2023-06-28 RX ADMIN — Medication 10 ML: at 13:13

## 2023-06-28 RX ADMIN — IOPAMIDOL 75 ML: 755 INJECTION, SOLUTION INTRAVENOUS at 13:12

## 2023-06-28 ASSESSMENT — PAIN - FUNCTIONAL ASSESSMENT: PAIN_FUNCTIONAL_ASSESSMENT: NONE - DENIES PAIN

## 2025-01-28 ENCOUNTER — HOSPITAL ENCOUNTER (EMERGENCY)
Age: 25
Discharge: HOME | End: 2025-01-28
Attending: EMERGENCY MEDICINE
Payer: COMMERCIAL

## 2025-01-28 ENCOUNTER — APPOINTMENT (OUTPATIENT)
Dept: GENERAL RADIOLOGY | Age: 25
End: 2025-01-28
Payer: COMMERCIAL

## 2025-01-28 VITALS
SYSTOLIC BLOOD PRESSURE: 140 MMHG | DIASTOLIC BLOOD PRESSURE: 63 MMHG | OXYGEN SATURATION: 95 % | RESPIRATION RATE: 16 BRPM | HEART RATE: 89 BPM | HEIGHT: 73 IN | BODY MASS INDEX: 24.52 KG/M2 | WEIGHT: 185 LBS | TEMPERATURE: 98.1 F

## 2025-01-28 DIAGNOSIS — J10.1 INFLUENZA A: Primary | ICD-10-CM

## 2025-01-28 LAB
FLUAV RNA RESP QL NAA+PROBE: DETECTED
FLUBV RNA RESP QL NAA+PROBE: NOT DETECTED
RSV BY PCR: NOT DETECTED
SARS-COV-2 RNA RESP QL NAA+PROBE: NOT DETECTED
SOURCE: ABNORMAL
SPECIMEN DESCRIPTION: ABNORMAL
SPECIMEN SOURCE: NORMAL
SPECIMEN SOURCE: NORMAL
STREP A, MOLECULAR: NEGATIVE

## 2025-01-28 PROCEDURE — 87651 STREP A DNA AMP PROBE: CPT

## 2025-01-28 PROCEDURE — 87636 SARSCOV2 & INF A&B AMP PRB: CPT

## 2025-01-28 PROCEDURE — 71045 X-RAY EXAM CHEST 1 VIEW: CPT

## 2025-01-28 PROCEDURE — 99284 EMERGENCY DEPT VISIT MOD MDM: CPT

## 2025-01-28 PROCEDURE — 87634 RSV DNA/RNA AMP PROBE: CPT

## 2025-01-28 RX ORDER — OSELTAMIVIR PHOSPHATE 75 MG/1
75 CAPSULE ORAL 2 TIMES DAILY
Qty: 10 CAPSULE | Refills: 0 | Status: SHIPPED | OUTPATIENT
Start: 2025-01-28 | End: 2025-02-02

## 2025-01-28 ASSESSMENT — PAIN DESCRIPTION - ORIENTATION: ORIENTATION: RIGHT;LEFT

## 2025-01-28 ASSESSMENT — PAIN DESCRIPTION - ONSET: ONSET: ON-GOING

## 2025-01-28 ASSESSMENT — PAIN DESCRIPTION - LOCATION: LOCATION: OTHER (COMMENT)

## 2025-01-28 ASSESSMENT — PAIN DESCRIPTION - DESCRIPTORS: DESCRIPTORS: ACHING;SORE

## 2025-01-28 ASSESSMENT — PAIN DESCRIPTION - FREQUENCY: FREQUENCY: CONTINUOUS

## 2025-01-28 ASSESSMENT — PAIN - FUNCTIONAL ASSESSMENT
PAIN_FUNCTIONAL_ASSESSMENT: ACTIVITIES ARE NOT PREVENTED
PAIN_FUNCTIONAL_ASSESSMENT: 0-10

## 2025-01-28 ASSESSMENT — PAIN DESCRIPTION - PAIN TYPE: TYPE: ACUTE PAIN

## 2025-01-28 ASSESSMENT — PAIN SCALES - GENERAL: PAINLEVEL_OUTOF10: 8

## 2025-01-28 NOTE — ED PROVIDER NOTES
[facility] EMERGENCY DEPARTMENT  EMERGENCY DEPARTMENT ENCOUNTER        Pt Name: Girish Martinez  MRN: [id number]  Birthdate 2000  Date of evaluation: 1/28/2025  Provider: Silvia Serra DO  PCP: Demetrius Motta MD  Note Started: 6:22 AM EST 1/28/25    CHIEF COMPLAINT       Chief Complaint   Patient presents with    Cough     Coughing productive of yellow/green sputum; awoke with fever of 101.8 at 420am; took tylenol at home    Pharyngitis     Sore throat and sinus congestion since yesterday; body aches       HISTORY OF PRESENT ILLNESS: 1 or more Elements   History From: patient    Limitations to history : None    Girish Martinez is a 24 y.o. male who presents with cough, congestion, body aches, fatigue, fever beginning yesterday.  He did take Tylenol this morning.  The complaint has been persistent, moderate in severity, and worsened by nothing.  He is tolerating p.o.  Patient denies sore throat, chest pain, shortness of breath, edema, headache, visual disturbances, focal paresthesias, focal weakness, abdominal pain, nausea, vomiting, diarrhea, constipation, dysuria, hematuria, trauma, neck or back pain, rash or other complaints.        Nursing Notes were all reviewed and agreed with or any disagreements were addressed in the HPI.    REVIEW OF SYSTEMS :           Positives and Pertinent negatives as per HPI.     SURGICAL HISTORY     Past Surgical History:   Procedure Laterality Date    TONSILLECTOMY         CURRENTMEDICATIONS       Discharge Medication List as of 1/28/2025  6:30 AM        CONTINUE these medications which have NOT CHANGED    Details   divalproex (DEPAKOTE) 500 MG DR tablet Take 1 tablet by mouth 2 times daily, Disp-60 tablet, R-0Normal      venlafaxine (EFFEXOR XR) 37.5 MG extended release capsule Take 3 capsules by mouth daily (with breakfast), Disp-90 capsule, R-0Normal             ALLERGIES     Advil [ibuprofen], Asa [aspirin], and Nsaids    FAMILYHISTORY     History

## 2025-05-14 ENCOUNTER — HOSPITAL ENCOUNTER (EMERGENCY)
Age: 25
Discharge: HOME | End: 2025-05-14
Payer: COMMERCIAL

## 2025-05-14 VITALS
RESPIRATION RATE: 16 BRPM | SYSTOLIC BLOOD PRESSURE: 144 MMHG | DIASTOLIC BLOOD PRESSURE: 76 MMHG | TEMPERATURE: 98.1 F | WEIGHT: 190 LBS | HEART RATE: 71 BPM | BODY MASS INDEX: 25.07 KG/M2 | OXYGEN SATURATION: 96 %

## 2025-05-14 DIAGNOSIS — S61.210A LACERATION OF RIGHT INDEX FINGER W/O FOREIGN BODY W/O DAMAGE TO NAIL, INITIAL ENCOUNTER: Primary | ICD-10-CM

## 2025-05-14 PROCEDURE — 12001 RPR S/N/AX/GEN/TRNK 2.5CM/<: CPT

## 2025-05-14 PROCEDURE — 99282 EMERGENCY DEPT VISIT SF MDM: CPT

## 2025-05-14 ASSESSMENT — PAIN DESCRIPTION - ORIENTATION: ORIENTATION: RIGHT

## 2025-05-14 ASSESSMENT — PAIN DESCRIPTION - DESCRIPTORS: DESCRIPTORS: BURNING;SORE;DISCOMFORT

## 2025-05-14 ASSESSMENT — PAIN - FUNCTIONAL ASSESSMENT: PAIN_FUNCTIONAL_ASSESSMENT: 0-10

## 2025-05-14 ASSESSMENT — PAIN DESCRIPTION - LOCATION: LOCATION: HAND

## 2025-05-14 ASSESSMENT — PAIN DESCRIPTION - PAIN TYPE: TYPE: ACUTE PAIN

## 2025-05-14 ASSESSMENT — PAIN SCALES - GENERAL: PAINLEVEL_OUTOF10: 3

## 2025-05-14 NOTE — DISCHARGE INSTRUCTIONS
Clean right index finger daily with soap and water reapply an antibiotic ointment and keep covered with a Band-Aid until healed.

## 2025-05-14 NOTE — ED PROVIDER NOTES
Weight:  86.2 kg (190 lb)       Patient was given the following medications:  Medications - No data to display       PROCEDURES   Difficult phalanx of the right index finger prepped with Betadine solution.  Distal phalanx of the right index finger anesthetized with 1 mL of 1% lidocaine solution.  2 cm in length by 3 mm in width by 3 mm in depth laceration of the distal phalanx of the right index finger explored and shows no evidence of foreign body or underlying structural damage.  Laceration well-approximated with 4 Ethilon 4.0 sutures.  Hemostasis obtained.  Patient tolerated procedure well.  Area cleaned and covered with a Band-Aid.    REASSESSMENT   5/14/25       Time: 1610  Patient’s condition is improving after treatment, remains stable, and has improved and is mildly symptomatic .    CONSULTS:  None    DIFFERENTIAL DX_MDM   MDM:   Social Determinants: None    Records Reviewed: Source, electronic medical record    CC/HPI Summary, DDx, ED Course, Impression: Patient presents with Laceration (Pt cut right index finger on a piece of metal at work today)   Girish Martinez is a 24 y.o. male who presents to the ED with complaints of a 2 cm laceration to the lateral aspect of the right index finger.  Patient states occurred today while at work.  Patient states he cut the distal aspect of the right index finger on a piece of binding metal patient states binding mobile that holds large bundles of steel coil together cut his index finger.  Patient states he is up-to-date on tetanus.  Patient states he has a history of von Willebrand's disease.  Patient maintains full range of motion of the fingers of the right hand.  Patient states he is right-handed.    Impression is right index finger laceration.  Plan is to discharge patient to home and have him follow-up with occupational medicine this week.  Patient is to clean the right index finger daily with soap and water reapply an antibiotic ointment to keep covered with a